# Patient Record
Sex: FEMALE | Race: WHITE | NOT HISPANIC OR LATINO
[De-identification: names, ages, dates, MRNs, and addresses within clinical notes are randomized per-mention and may not be internally consistent; named-entity substitution may affect disease eponyms.]

---

## 2017-02-01 ENCOUNTER — APPOINTMENT (OUTPATIENT)
Dept: OBGYN | Facility: HOSPITAL | Age: 42
End: 2017-02-01

## 2019-01-25 ENCOUNTER — EMERGENCY (EMERGENCY)
Facility: HOSPITAL | Age: 44
LOS: 1 days | Discharge: HOME | End: 2019-01-25
Attending: EMERGENCY MEDICINE | Admitting: EMERGENCY MEDICINE
Payer: MEDICAID

## 2019-01-25 VITALS
SYSTOLIC BLOOD PRESSURE: 106 MMHG | HEART RATE: 72 BPM | TEMPERATURE: 98 F | RESPIRATION RATE: 16 BRPM | OXYGEN SATURATION: 96 % | DIASTOLIC BLOOD PRESSURE: 69 MMHG

## 2019-01-25 VITALS
HEART RATE: 71 BPM | SYSTOLIC BLOOD PRESSURE: 127 MMHG | OXYGEN SATURATION: 99 % | DIASTOLIC BLOOD PRESSURE: 86 MMHG | TEMPERATURE: 97 F | RESPIRATION RATE: 17 BRPM

## 2019-01-25 PROCEDURE — 70450 CT HEAD/BRAIN W/O DYE: CPT | Mod: 26

## 2019-01-25 PROCEDURE — 99284 EMERGENCY DEPT VISIT MOD MDM: CPT

## 2019-01-25 RX ORDER — OXYCODONE AND ACETAMINOPHEN 5; 325 MG/1; MG/1
2 TABLET ORAL ONCE
Qty: 0 | Refills: 0 | Status: DISCONTINUED | OUTPATIENT
Start: 2019-01-25 | End: 2019-01-25

## 2019-01-25 RX ORDER — OXYCODONE HYDROCHLORIDE 5 MG/1
1 TABLET ORAL
Qty: 9 | Refills: 0
Start: 2019-01-25 | End: 2019-01-27

## 2019-01-25 RX ORDER — GABAPENTIN 400 MG/1
1 CAPSULE ORAL
Qty: 12 | Refills: 0
Start: 2019-01-25 | End: 2019-01-28

## 2019-01-25 RX ORDER — CYCLOBENZAPRINE HYDROCHLORIDE 10 MG/1
10 TABLET, FILM COATED ORAL ONCE
Qty: 0 | Refills: 0 | Status: COMPLETED | OUTPATIENT
Start: 2019-01-25 | End: 2019-01-25

## 2019-01-25 RX ORDER — CYCLOBENZAPRINE HYDROCHLORIDE 10 MG/1
1 TABLET, FILM COATED ORAL
Qty: 8 | Refills: 0
Start: 2019-01-25 | End: 2019-01-28

## 2019-01-25 RX ORDER — HYDROCODONE BITARTRATE 50 MG/1
1 CAPSULE, EXTENDED RELEASE ORAL
Qty: 8 | Refills: 0 | OUTPATIENT
Start: 2019-01-25 | End: 2019-01-28

## 2019-01-25 RX ORDER — GABAPENTIN 400 MG/1
800 CAPSULE ORAL ONCE
Qty: 0 | Refills: 0 | Status: COMPLETED | OUTPATIENT
Start: 2019-01-25 | End: 2019-01-25

## 2019-01-25 RX ADMIN — GABAPENTIN 800 MILLIGRAM(S): 400 CAPSULE ORAL at 11:23

## 2019-01-25 RX ADMIN — CYCLOBENZAPRINE HYDROCHLORIDE 10 MILLIGRAM(S): 10 TABLET, FILM COATED ORAL at 11:21

## 2019-01-25 RX ADMIN — OXYCODONE AND ACETAMINOPHEN 2 TABLET(S): 5; 325 TABLET ORAL at 11:25

## 2019-01-25 NOTE — ED PROVIDER NOTE - NSFOLLOWUPINSTRUCTIONS_ED_ALL_ED_FT
Seen in Seen in ED left facial tingling and numbness. ED CAT scan negative stroke. Seen in ED left facial tingling and numbness. ED CAT scan negative stroke and you were seen by [facility]. If symptoms persist you can follow with [facility] at [phone]. You can continue your home meds Flexeril 10 mg every 12 hours, Gabapentin 800 mg every 8 hours and Vicodine 10 mg every 12 hours. NEVER DRIVE OR OPERATE MACHINERY ON ANY OF THESE MEDICATIONS. You must keep your Monday appointment with your Pain Management for refills. Return to ER for new or worsening symptoms.

## 2019-01-25 NOTE — ED ADULT NURSE REASSESSMENT NOTE - NS ED NURSE REASSESS COMMENT FT1
pt alert,oriented. reports increasing pains to both sides neck .radiating to head,back.increased pain l side.  states last received  meds 1/15.eval by md,meds as ordered

## 2019-01-25 NOTE — ED PROVIDER NOTE - CRANIAL NERVE AND PUPILLARY EXAM
tongue is midline/extra-ocular movements intact/Subj left vi-v3 numbness, intact forehead wrinkling, eomi intact, neg tongue deviation, weak turn head to left, strong turn head to right, neg pronator drift, 5/5 str ue and le, sensation intact x 4 extremities

## 2019-01-25 NOTE — ED PROVIDER NOTE - PROGRESS NOTE DETAILS
Favio att: CT head neg ich. Dc with meds until Monday when MD returns. GERALDO Stahl asked me to prescribe oxycodone 10 mg q8 x 3 days for patient on behalf of her as her token was not working

## 2019-01-25 NOTE — CONSULT NOTE ADULT - ASSESSMENT
42 YO F with degenerative disc disease throughout spine since an accident in 2011, currently seeing pain management out of pain meds, presents for L facial/arm numbness and pain x1 week. Neurology consulted to rule out a central cause. Pt with no stroke risk factors, highly unlikely that this is an acute ischemic event, however will r/o with CT Head. Her symptoms are likely related to her uncontrolled pain at the time / referred pain from cervical spine. Recommend pain management and symptomatic relief of her chronic pain. CT Head to r/o ischemia, CT C Spine. If normal, can follow up outpatient with her neurologist with whom she follows.

## 2019-01-25 NOTE — CONSULT NOTE ADULT - NSHPATTENDINGPLANDISCUSS_GEN_ALL_CORE
neurology resident and I agree with above plan and outpatient neurology follow up with her pain management and private neurologist.

## 2019-01-25 NOTE — ED ADULT TRIAGE NOTE - CHIEF COMPLAINT QUOTE
pt c/o left eye pain with numbness and tingling to the face radiating to the ear and sharp pains from the neck to the shoulder.

## 2019-01-25 NOTE — ED PROVIDER NOTE - OBJECTIVE STATEMENT
10:50 Favio att: 43F h/o djd c/o left face numbness and tingling x 1 -2 weeks. 2011 Window fell 27 floors and landed on patient's head resulting in multi-level DJD. Sees Pain Management Akikoarina in [facility] [phone] and takes Hydrocone 10/325 mg TID, Gabapentin 800 mg TID, and Flexeril 10 mg BID. Pain Mgmt doc "is on vacation and did not refill my meds (1 wk ago)." Past 1 left face tingling and numbness, "head feels like a subwoofer, can't hear well, left eye vision not as good as right eye vision." Earlier today patient was seen by Ophthamologist, report indicates consult for 2 wk left face and eye twitching, intact visual exam, referred for outpt MRI. Patient then saw Urgent Care this morning, rec'd a Toradol shot, referred to ER. Patient presents to ER extremely concerned her left face feels tingly and hotter than right. CT ordered and Neuro consulted. PMH djd, scoliosis, PSH tubal ligation MED mvi and as above PH [facility] [address]

## 2019-01-25 NOTE — CONSULT NOTE ADULT - SUBJECTIVE AND OBJECTIVE BOX
NEUROLOGY CONSULT     44 YO F with reported extensive degenerative disc disease throughout spine 2/2 to accident that occurred in 2011 presents with worsening pain L facial numbness. Pt notes that 1 week ago she woke up with symptoms of L facial numbness and pain and vision changes in L eye as well as decreased hearing in L ear. She tried to manage her discomfort, but ended up going to see an ophthalmologist for her vision changes. She presents to ED with ophtho note - normal exam, normal fundi b/l without vision loss. She notes slight head pain rather than headache, neck pain, blurry vision on L, when asked about numbness in arm or leg she endorses that she does have some more so in her arm than leg. Feels that her body is "split right down the middle" w almost no feeling whatsoever on her L side. No sharp shooting pain into the face, some heat like sensation over L face. No tinnitus or whooshing sounds in ear. No dizziness. N  She perseverates that she is on pain medications for her chronic pain and that she has been out of her meds for the past 1 week since her pain management doctor is out of town until Mon. She takes pain meds and gets epidural injections as well by a neurologist - Dr Barreto. She notes the pain in her neck and back have worsened over this week as well and she has some difficulty with turning her head side to side.         PAST MEDICAL & SURGICAL HISTORY:  DDD      Allergies  naproxen (Anaphylaxis)  Intolerances        MEDICATIONS  (STANDING):    MEDICATIONS  (PRN):      SOCIAL HISTORY: Denies tobacco/EtOH/drug use     FAMILY HISTORY: noncotnributory      REVIEW OF SYSTEMS:  CONSTITUTIONAL:  No weight loss, fever, chills, weakness or fatigue.  HEENT:  Eyes:  No visual loss, blurred vision, double vision or yellow sclerae. Ears, Nose, Throat:  No hearing loss, sneezing, congestion, runny nose or sore throat.  SKIN:  No rash or itching.  CARDIOVASCULAR:  No chest pain, chest pressure or chest discomfort. No palpitations or edema.  RESPIRATORY:  No shortness of breath, cough or sputum.  GASTROINTESTINAL:  No anorexia, nausea, vomiting or diarrhea. No abdominal pain or blood.  GENITOURINARY:  denies incontinence/retention   NEUROLOGICAL: see hpi  MUSCULOSKELETAL:  No muscle, back pain, joint pain or stiffness.  HEMATOLOGIC:  No anemia, bleeding or bruising.  LYMPHATICS:  No enlarged nodes. No history of splenectomy.  PSYCHIATRIC:  No history of depression or anxiety.  ENDOCRINOLOGIC:  No reports of sweating, cold or heat intolerance. No polyuria or polydipsia.      Vital Signs Last 24 Hrs  T(C): 36.3 (25 Jan 2019 09:54), Max: 36.3 (25 Jan 2019 09:54)  T(F): 97.3 (25 Jan 2019 09:54), Max: 97.3 (25 Jan 2019 09:54)  HR: 72 (25 Jan 2019 11:18) (71 - 72)  BP: 124/90 (25 Jan 2019 11:18) (124/90 - 127/86)  BP(mean): --  RR: 16 (25 Jan 2019 11:18) (16 - 17)  SpO2: 99% (25 Jan 2019 11:18) (99% - 99%)    PHYSICAL EXAM:   General appearance: No acute distress                 Mental Status: AAOx3, fluent speech, follows simple commands, able to name  Cranial Nerves: EOMI, PERRL, VFF, V1-V3 intact, facial symmetric,  tongue midline  Motor: strength 5/5 throughout. No drift x4  Sensation: Intact to LT throughout  Coordination: FTN intact b/l  Reflexes: 1+ bilateral biceps, brachioradialis, patellar and ankle  Gait: normal and stable.      LABS:              IMAGING: NEUROLOGY CONSULT     44 YO F with reported extensive degenerative disc disease throughout spine 2/2 to accident that occurred in 2011 presents with worsening pain L facial numbness. Pt notes that 1 week ago she woke up with symptoms of L facial numbness and pain and vision changes in L eye as well as decreased hearing in L ear. She tried to manage her discomfort, but ended up going to see an ophthalmologist for her vision changes. She presents to ED with ophtho note - normal exam, normal fundi b/l without vision loss. She notes slight head pain rather than headache, neck pain, blurry vision on L, when asked about numbness in arm or leg she endorses that she does have some more so in her arm than leg. Feels that her body is "split right down the middle" w almost no feeling whatsoever on her L side. No sharp shooting pain into the face, some heat like sensation over L face. No tinnitus or whooshing sounds in ear. No dizziness. Denies any focal weakness. No dysarthria, diplopia or dysphagia.  She perseverates that she is on pain medications (flexeril, gabapentin, oxycodone) for her chronic pain and that she has been out of her meds for the past 1 week since her pain management doctor is out of town until Mon. She takes pain meds and gets epidural injections as well by a neurologist - Dr Barreto. She notes the pain in her neck and back have worsened over this week as well and she has some difficulty with turning her head side to side.         PAST MEDICAL & SURGICAL HISTORY:  DDD      Allergies  naproxen (Anaphylaxis)  Intolerances        MEDICATIONS  (STANDING):    MEDICATIONS  (PRN):      SOCIAL HISTORY: Denies tobacco/EtOH/drug use     FAMILY HISTORY: noncotnributory      REVIEW OF SYSTEMS:  CONSTITUTIONAL:  No weight loss, fever, chills, weakness or fatigue.  HEENT:  Eyes:  No visual loss, blurred vision, double vision or yellow sclerae. Ears, Nose, Throat:  No hearing loss, sneezing, congestion, runny nose or sore throat.  SKIN:  No rash or itching.  CARDIOVASCULAR:  No chest pain, chest pressure or chest discomfort. No palpitations or edema.  RESPIRATORY:  No shortness of breath, cough or sputum.  GASTROINTESTINAL:  No anorexia, nausea, vomiting or diarrhea. No abdominal pain or blood.  GENITOURINARY:  denies incontinence/retention   NEUROLOGICAL: see hpi  MUSCULOSKELETAL:  No muscle, back pain, joint pain or stiffness.  HEMATOLOGIC:  No anemia, bleeding or bruising.  LYMPHATICS:  No enlarged nodes. No history of splenectomy.  PSYCHIATRIC:  No history of depression or anxiety.  ENDOCRINOLOGIC:  No reports of sweating, cold or heat intolerance. No polyuria or polydipsia.      Vital Signs Last 24 Hrs  T(C): 36.3 (25 Jan 2019 09:54), Max: 36.3 (25 Jan 2019 09:54)  T(F): 97.3 (25 Jan 2019 09:54), Max: 97.3 (25 Jan 2019 09:54)  HR: 72 (25 Jan 2019 11:18) (71 - 72)  BP: 124/90 (25 Jan 2019 11:18) (124/90 - 127/86)  BP(mean): --  RR: 16 (25 Jan 2019 11:18) (16 - 17)  SpO2: 99% (25 Jan 2019 11:18) (99% - 99%)    PHYSICAL EXAM:   General appearance: No acute distress                 Mental Status: AAOx3, fluent speech, follows simple commands, able to name  Cranial Nerves: EOMI, PERRL, VFF, face symmetric,  tongue midline  Motor: strength 5/5 R side throughout. 4+/5 effort and pain limited in UE.  Sensation: profound sensory loss over L face and L arm - almost none. Splits immediately down the middle when checking sensation on face.  Coordination: FTN intact b/l, no dysmetria  Reflexes: 2+ bilateral biceps, brachioradialis, patellar and ankle  Gait: normal no ataxia     LABS:              IMAGING:

## 2019-04-14 ENCOUNTER — EMERGENCY (EMERGENCY)
Facility: HOSPITAL | Age: 44
LOS: 1 days | Discharge: HOME | End: 2019-04-14
Attending: EMERGENCY MEDICINE | Admitting: EMERGENCY MEDICINE
Payer: MEDICAID

## 2019-04-14 VITALS
HEART RATE: 78 BPM | DIASTOLIC BLOOD PRESSURE: 98 MMHG | OXYGEN SATURATION: 99 % | RESPIRATION RATE: 30 BRPM | TEMPERATURE: 99 F | SYSTOLIC BLOOD PRESSURE: 139 MMHG

## 2019-04-14 VITALS
RESPIRATION RATE: 23 BRPM | SYSTOLIC BLOOD PRESSURE: 132 MMHG | HEART RATE: 60 BPM | OXYGEN SATURATION: 100 % | DIASTOLIC BLOOD PRESSURE: 75 MMHG

## 2019-04-14 LAB
ALBUMIN SERPL ELPH-MCNC: 4.7 G/DL — SIGNIFICANT CHANGE UP (ref 3.3–5)
ALP SERPL-CCNC: 61 U/L — SIGNIFICANT CHANGE UP (ref 40–120)
ALT FLD-CCNC: 12 U/L — SIGNIFICANT CHANGE UP (ref 4–33)
ANION GAP SERPL CALC-SCNC: 15 MMO/L — HIGH (ref 7–14)
AST SERPL-CCNC: 22 U/L — SIGNIFICANT CHANGE UP (ref 4–32)
B PERT DNA SPEC QL NAA+PROBE: NOT DETECTED — SIGNIFICANT CHANGE UP
BASE EXCESS BLDV CALC-SCNC: 0.8 MMOL/L — SIGNIFICANT CHANGE UP
BILIRUB SERPL-MCNC: < 0.2 MG/DL — LOW (ref 0.2–1.2)
BLOOD GAS VENOUS - CREATININE: 0.83 MG/DL — SIGNIFICANT CHANGE UP (ref 0.5–1.3)
BUN SERPL-MCNC: 19 MG/DL — SIGNIFICANT CHANGE UP (ref 7–23)
C PNEUM DNA SPEC QL NAA+PROBE: NOT DETECTED — SIGNIFICANT CHANGE UP
CALCIUM SERPL-MCNC: 9.9 MG/DL — SIGNIFICANT CHANGE UP (ref 8.4–10.5)
CHLORIDE BLDV-SCNC: 104 MMOL/L — SIGNIFICANT CHANGE UP (ref 96–108)
CHLORIDE SERPL-SCNC: 100 MMOL/L — SIGNIFICANT CHANGE UP (ref 98–107)
CO2 SERPL-SCNC: 21 MMOL/L — LOW (ref 22–31)
CREAT SERPL-MCNC: 0.76 MG/DL — SIGNIFICANT CHANGE UP (ref 0.5–1.3)
FLUAV H1 2009 PAND RNA SPEC QL NAA+PROBE: NOT DETECTED — SIGNIFICANT CHANGE UP
FLUAV H1 RNA SPEC QL NAA+PROBE: NOT DETECTED — SIGNIFICANT CHANGE UP
FLUAV H3 RNA SPEC QL NAA+PROBE: NOT DETECTED — SIGNIFICANT CHANGE UP
FLUAV SUBTYP SPEC NAA+PROBE: NOT DETECTED — SIGNIFICANT CHANGE UP
FLUBV RNA SPEC QL NAA+PROBE: NOT DETECTED — SIGNIFICANT CHANGE UP
GAS PNL BLDV: 139 MMOL/L — SIGNIFICANT CHANGE UP (ref 136–146)
GLUCOSE BLDV-MCNC: 92 — SIGNIFICANT CHANGE UP (ref 70–99)
GLUCOSE SERPL-MCNC: 88 MG/DL — SIGNIFICANT CHANGE UP (ref 70–99)
HADV DNA SPEC QL NAA+PROBE: NOT DETECTED — SIGNIFICANT CHANGE UP
HCG SERPL-ACNC: < 5 MIU/ML — SIGNIFICANT CHANGE UP
HCO3 BLDV-SCNC: 24 MMOL/L — SIGNIFICANT CHANGE UP (ref 20–27)
HCOV PNL SPEC NAA+PROBE: SIGNIFICANT CHANGE UP
HCT VFR BLD CALC: 44.1 % — SIGNIFICANT CHANGE UP (ref 34.5–45)
HCT VFR BLDV CALC: 44.7 % — SIGNIFICANT CHANGE UP (ref 34.5–45)
HGB BLD-MCNC: 14.5 G/DL — SIGNIFICANT CHANGE UP (ref 11.5–15.5)
HGB BLDV-MCNC: 14.6 G/DL — SIGNIFICANT CHANGE UP (ref 11.5–15.5)
HMPV RNA SPEC QL NAA+PROBE: NOT DETECTED — SIGNIFICANT CHANGE UP
HPIV1 RNA SPEC QL NAA+PROBE: NOT DETECTED — SIGNIFICANT CHANGE UP
HPIV2 RNA SPEC QL NAA+PROBE: NOT DETECTED — SIGNIFICANT CHANGE UP
HPIV3 RNA SPEC QL NAA+PROBE: NOT DETECTED — SIGNIFICANT CHANGE UP
HPIV4 RNA SPEC QL NAA+PROBE: NOT DETECTED — SIGNIFICANT CHANGE UP
LACTATE BLDV-MCNC: 2 MMOL/L — SIGNIFICANT CHANGE UP (ref 0.5–2)
MCHC RBC-ENTMCNC: 29.7 PG — SIGNIFICANT CHANGE UP (ref 27–34)
MCHC RBC-ENTMCNC: 32.9 % — SIGNIFICANT CHANGE UP (ref 32–36)
MCV RBC AUTO: 90.4 FL — SIGNIFICANT CHANGE UP (ref 80–100)
NRBC # FLD: 0 K/UL — SIGNIFICANT CHANGE UP (ref 0–0)
PCO2 BLDV: 38 MMHG — LOW (ref 41–51)
PH BLDV: 7.43 PH — SIGNIFICANT CHANGE UP (ref 7.32–7.43)
PLATELET # BLD AUTO: 249 K/UL — SIGNIFICANT CHANGE UP (ref 150–400)
PMV BLD: 12.3 FL — SIGNIFICANT CHANGE UP (ref 7–13)
PO2 BLDV: 31 MMHG — LOW (ref 35–40)
POTASSIUM BLDV-SCNC: 3.9 MMOL/L — SIGNIFICANT CHANGE UP (ref 3.4–4.5)
POTASSIUM SERPL-MCNC: 4.8 MMOL/L — SIGNIFICANT CHANGE UP (ref 3.5–5.3)
POTASSIUM SERPL-SCNC: 4.8 MMOL/L — SIGNIFICANT CHANGE UP (ref 3.5–5.3)
PROT SERPL-MCNC: 8.2 G/DL — SIGNIFICANT CHANGE UP (ref 6–8.3)
RBC # BLD: 4.88 M/UL — SIGNIFICANT CHANGE UP (ref 3.8–5.2)
RBC # FLD: 13.2 % — SIGNIFICANT CHANGE UP (ref 10.3–14.5)
RSV RNA SPEC QL NAA+PROBE: NOT DETECTED — SIGNIFICANT CHANGE UP
RV+EV RNA SPEC QL NAA+PROBE: NOT DETECTED — SIGNIFICANT CHANGE UP
SAO2 % BLDV: 58 % — LOW (ref 60–85)
SODIUM SERPL-SCNC: 136 MMOL/L — SIGNIFICANT CHANGE UP (ref 135–145)
TROPONIN T, HIGH SENSITIVITY: < 6 NG/L — SIGNIFICANT CHANGE UP (ref ?–14)
WBC # BLD: 12.37 K/UL — HIGH (ref 3.8–10.5)
WBC # FLD AUTO: 12.37 K/UL — HIGH (ref 3.8–10.5)

## 2019-04-14 PROCEDURE — 71275 CT ANGIOGRAPHY CHEST: CPT | Mod: 26

## 2019-04-14 PROCEDURE — 93010 ELECTROCARDIOGRAM REPORT: CPT

## 2019-04-14 PROCEDURE — 99284 EMERGENCY DEPT VISIT MOD MDM: CPT | Mod: 25

## 2019-04-14 RX ORDER — OXYCODONE AND ACETAMINOPHEN 5; 325 MG/1; MG/1
1 TABLET ORAL ONCE
Qty: 0 | Refills: 0 | Status: DISCONTINUED | OUTPATIENT
Start: 2019-04-14 | End: 2019-04-14

## 2019-04-14 RX ORDER — SODIUM CHLORIDE 9 MG/ML
1000 INJECTION INTRAMUSCULAR; INTRAVENOUS; SUBCUTANEOUS ONCE
Qty: 0 | Refills: 0 | Status: COMPLETED | OUTPATIENT
Start: 2019-04-14 | End: 2019-04-14

## 2019-04-14 RX ORDER — ALBUTEROL 90 UG/1
2.5 AEROSOL, METERED ORAL ONCE
Qty: 0 | Refills: 0 | Status: COMPLETED | OUTPATIENT
Start: 2019-04-14 | End: 2019-04-14

## 2019-04-14 RX ADMIN — ALBUTEROL 2.5 MILLIGRAM(S): 90 AEROSOL, METERED ORAL at 19:59

## 2019-04-14 RX ADMIN — OXYCODONE AND ACETAMINOPHEN 1 TABLET(S): 5; 325 TABLET ORAL at 21:14

## 2019-04-14 RX ADMIN — SODIUM CHLORIDE 1000 MILLILITER(S): 9 INJECTION INTRAMUSCULAR; INTRAVENOUS; SUBCUTANEOUS at 19:59

## 2019-04-14 NOTE — ED ADULT NURSE NOTE - CHIEF COMPLAINT QUOTE
reports diff breathing x 3 days with productive cough. did not check temp. pt hyperventilating on arrival,states  intermittent.  lungs clear,no wheezing  reports ch pains with cough

## 2019-04-14 NOTE — ED PROVIDER NOTE - ATTENDING CONTRIBUTION TO CARE
44F no PMH p/w 3d of b/l cp, upper abd pain. Also non-productive cough. Also worsening SOB. Went to PCP who empirically prescribed amoxicillin. Symptoms worsening so came to ED. Tachypenic, other vitals wnl. Exam as above. EKG incomplete RBBB.  ddx: Though pt is tachypneic, Lungs are CTAB and other vitals are completely normal. Possible PE vs. URI vs. less likely ACS or myocarditis. Possible aspect of rAD. Possible hyperventilation/panic but dx of exclusion.  CBC, cmp, vbg comp, blood cx, trop, RVP. IVF, trial neb. CTA. Pt already on abx and afebrile, will hold additional abx for now.   Trial neb.  D/w DR. amaya, pt transferred to [facility] in ED for further care.

## 2019-04-14 NOTE — ED ADULT NURSE REASSESSMENT NOTE - NS ED NURSE REASSESS COMMENT FT1
report received from Sammy RN, pt A&Ox3, tachypnic with RR 26, pt denies n/v/d, dizziness, headache, cp. pt NSR on CM, sating 100 oxygen on room air, MD aware of RR, will continue to monitor.

## 2019-04-14 NOTE — ED PROVIDER NOTE - CLINICAL SUMMARY MEDICAL DECISION MAKING FREE TEXT BOX
44F w/ sob, cough, chest and abd pain, dramatically increased work of breathing, however besides RR other vital signs wnl and lung auscultation unremarkable; high degree of suspicion for PE, also concern for viral uri, will work up for ACS; will reassess

## 2019-04-14 NOTE — ED PROVIDER NOTE - NSFOLLOWUPINSTRUCTIONS_ED_ALL_ED_FT
1) Please follow-up with your primary care doctor.  If you cannot follow-up with your doctor(s), please return to the ED for any urgent issues.  2) If you have any worsening of symptoms or any other concerns please return to the ED immediately.  3) Please continue taking your home medications as directed.  4) You may have been given a copy of your labs and/or imaging.  Please go over these with your primary care doctor.

## 2019-04-14 NOTE — ED PROVIDER NOTE - PHYSICAL EXAMINATION
*GEN:   uncomfortable, in moderate respiratory distress and increased work of breathing, AOx3  *EYES:   pupils equally round and reactive to light, extra-occular movements intact  *HEENT:   airway patent, moist mucosal membranes, full ROM neck  *CV:   regular rate and rhythm  *RESP:   clear to auscultation bilaterally throughout, labored breathing - although vitals wnl  *ABD:   soft, mildly tender over epigastrum  *:   no cva/flank tenderness  *MSK:   no MSK tenderness or limited ROM  *SKIN:   dry, intact  *NEURO:   AOx3, no focal weakness or loss of sensation *GEN:   uncomfortable, in moderate respiratory distress and increased work of breathing, AOx3  *EYES:   pupils equally round and reactive to light, extra-occular movements intact  *HEENT:   airway patent, moist mucosal membranes, full ROM neck  *CV:   regular rate and rhythm  *RESP:   clear to auscultation bilaterally throughout, labored breathing - although vitals wnl  *ABD:   soft, mildly tender over epigastrum  *:   no cva/flank tenderness  *MSK:   no MSK tenderness or limited ROM  *SKIN:   dry, intact  *NEURO:   AOx3, no focal weakness or loss of sensation    Klepfish: no LE edema, normal equal distal pulses. 2-3 word sentences.  unofficial bedside sono: grossly normal heart function, no pericardial effusion, a-line predmoinance

## 2019-04-14 NOTE — ED PROVIDER NOTE - OBJECTIVE STATEMENT
44F w/out pmh p/w sob, chest pain, upper abd pain, cough x 3 days, worsening. Seen by PCP 3 days ago, prescribed amoxicillin for sinusitis, however sx worsening since then. Appears very tachypneic with increased work of breathing. No other med changes, doesn't take any meds normally. Denies leg pain / swelling. No neuro complaints. No recent travel or hospitalization. Pt with difficulty forming complete sentences 2/2 respiratory distress.    PCP: Edwina (?) Manpreet 44F w/out pmh p/w sob, chest pain, upper abd pain, cough x 3 days, worsening. Seen by PCP 3 days ago, prescribed amoxicillin for sinusitis, however sx worsening since then. Appears very tachypneic with increased work of breathing. No other med changes, doesn't take any meds normally. Denies leg pain / swelling. No neuro complaints. No recent travel or hospitalization. Pt with difficulty forming complete sentences 2/2 respiratory distress.  PCP: Edwina (?) Manpreet De La Rosa: 44F no PMH p/w 3d of b/l cp, upper abd pain. Also non-productive cough. Also worsening SOB. Went to PCP who empirically prescribed amoxicillin. Symptoms worsening so came to ED. Denies f/c, rhinorrhea, sore throat, HA, NVD, urinary complaints, focal weakness/numbness, LE pain/swelling, black/bloody stool. 44F w/out pmh p/w sob, chest pain, upper abd pain, cough x 3 days, worsening. Seen by PCP 3 days ago, prescribed amoxicillin for sinusitis, however sx worsening since then. Appears very tachypneic with increased work of breathing. No other med changes, doesn't take any meds normally. Denies leg pain / swelling. No neuro complaints. No recent travel or hospitalization. Pt with difficulty forming complete sentences 2/2 respiratory distress.  PCP: Edwina (?) Manpreet De La Rosa: 44F no PMH p/w 3d of b/l cp, upper abd pain. Also non-productive cough. Also worsening SOB. Went to PCP who empirically prescribed amoxicillin. Symptoms worsening so came to ED. Denies f/c, rhinorrhea, sore throat, HA, NVD, urinary complaints, focal weakness/numbness, LE pain/swelling, black/bloody stool, rashes.

## 2019-04-14 NOTE — ED PROVIDER NOTE - PROGRESS NOTE DETAILS
Richard Roberson PGY2: vital signs remain unremarkable besides RR30, patient being transferred to [facility] for tachypnea

## 2019-04-14 NOTE — ED ADULT NURSE NOTE - OBJECTIVE STATEMENT
Pt received to intake tachypneic and c/o difficulty breathing and cough x 3 days. Lungs are CTA and pt with O2 sat of 100% on room air. Labs sent and pt taken to main ED.

## 2019-04-14 NOTE — ED ADULT TRIAGE NOTE - CHIEF COMPLAINT QUOTE
reports diff breathing x 3 days with productive cough. did not check temp. pt hyperventilating on arrival,states  intermittent.  lungs clear,no wheezing reports diff breathing x 3 days with productive cough. did not check temp. pt hyperventilating on arrival,states  intermittent.  lungs clear,no wheezing  reports ch pains with cough

## 2019-04-15 LAB
SPECIMEN SOURCE: SIGNIFICANT CHANGE UP
SPECIMEN SOURCE: SIGNIFICANT CHANGE UP

## 2019-04-19 LAB
BACTERIA BLD CULT: SIGNIFICANT CHANGE UP
BACTERIA BLD CULT: SIGNIFICANT CHANGE UP

## 2019-05-17 ENCOUNTER — INPATIENT (INPATIENT)
Facility: HOSPITAL | Age: 44
LOS: 2 days | Discharge: HOME | End: 2019-05-20
Attending: HOSPITALIST | Admitting: HOSPITALIST
Payer: MEDICAID

## 2019-05-17 VITALS
RESPIRATION RATE: 17 BRPM | SYSTOLIC BLOOD PRESSURE: 130 MMHG | TEMPERATURE: 98 F | HEART RATE: 76 BPM | OXYGEN SATURATION: 99 % | DIASTOLIC BLOOD PRESSURE: 95 MMHG

## 2019-05-17 LAB
ALBUMIN SERPL ELPH-MCNC: 4.4 G/DL — SIGNIFICANT CHANGE UP (ref 3.3–5)
ALP SERPL-CCNC: 52 U/L — SIGNIFICANT CHANGE UP (ref 40–120)
ALT FLD-CCNC: 9 U/L — SIGNIFICANT CHANGE UP (ref 4–33)
ANION GAP SERPL CALC-SCNC: 13 MMO/L — SIGNIFICANT CHANGE UP (ref 7–14)
APTT BLD: 28.1 SEC — SIGNIFICANT CHANGE UP (ref 27.5–36.3)
AST SERPL-CCNC: 12 U/L — SIGNIFICANT CHANGE UP (ref 4–32)
BASOPHILS # BLD AUTO: 0.01 K/UL — SIGNIFICANT CHANGE UP (ref 0–0.2)
BASOPHILS NFR BLD AUTO: 0.1 % — SIGNIFICANT CHANGE UP (ref 0–2)
BILIRUB SERPL-MCNC: 0.2 MG/DL — SIGNIFICANT CHANGE UP (ref 0.2–1.2)
BLD GP AB SCN SERPL QL: NEGATIVE — SIGNIFICANT CHANGE UP
BUN SERPL-MCNC: 12 MG/DL — SIGNIFICANT CHANGE UP (ref 7–23)
CALCIUM SERPL-MCNC: 9.9 MG/DL — SIGNIFICANT CHANGE UP (ref 8.4–10.5)
CHLORIDE SERPL-SCNC: 104 MMOL/L — SIGNIFICANT CHANGE UP (ref 98–107)
CO2 SERPL-SCNC: 23 MMOL/L — SIGNIFICANT CHANGE UP (ref 22–31)
CREAT SERPL-MCNC: 0.67 MG/DL — SIGNIFICANT CHANGE UP (ref 0.5–1.3)
EOSINOPHIL # BLD AUTO: 0 K/UL — SIGNIFICANT CHANGE UP (ref 0–0.5)
EOSINOPHIL NFR BLD AUTO: 0 % — SIGNIFICANT CHANGE UP (ref 0–6)
GLUCOSE SERPL-MCNC: 95 MG/DL — SIGNIFICANT CHANGE UP (ref 70–99)
HCG SERPL-ACNC: < 5 MIU/ML — SIGNIFICANT CHANGE UP
HCT VFR BLD CALC: 43 % — SIGNIFICANT CHANGE UP (ref 34.5–45)
HGB BLD-MCNC: 13.9 G/DL — SIGNIFICANT CHANGE UP (ref 11.5–15.5)
IMM GRANULOCYTES NFR BLD AUTO: 0.4 % — SIGNIFICANT CHANGE UP (ref 0–1.5)
INR BLD: 1.13 — SIGNIFICANT CHANGE UP (ref 0.88–1.17)
LYMPHOCYTES # BLD AUTO: 1.32 K/UL — SIGNIFICANT CHANGE UP (ref 1–3.3)
LYMPHOCYTES # BLD AUTO: 12.8 % — LOW (ref 13–44)
MCHC RBC-ENTMCNC: 29.9 PG — SIGNIFICANT CHANGE UP (ref 27–34)
MCHC RBC-ENTMCNC: 32.3 % — SIGNIFICANT CHANGE UP (ref 32–36)
MCV RBC AUTO: 92.5 FL — SIGNIFICANT CHANGE UP (ref 80–100)
MONOCYTES # BLD AUTO: 0.25 K/UL — SIGNIFICANT CHANGE UP (ref 0–0.9)
MONOCYTES NFR BLD AUTO: 2.4 % — SIGNIFICANT CHANGE UP (ref 2–14)
NEUTROPHILS # BLD AUTO: 8.71 K/UL — HIGH (ref 1.8–7.4)
NEUTROPHILS NFR BLD AUTO: 84.3 % — HIGH (ref 43–77)
NRBC # FLD: 0 K/UL — SIGNIFICANT CHANGE UP (ref 0–0)
PLATELET # BLD AUTO: 234 K/UL — SIGNIFICANT CHANGE UP (ref 150–400)
PMV BLD: 11.1 FL — SIGNIFICANT CHANGE UP (ref 7–13)
POTASSIUM SERPL-MCNC: 4 MMOL/L — SIGNIFICANT CHANGE UP (ref 3.5–5.3)
POTASSIUM SERPL-SCNC: 4 MMOL/L — SIGNIFICANT CHANGE UP (ref 3.5–5.3)
PROT SERPL-MCNC: 7.8 G/DL — SIGNIFICANT CHANGE UP (ref 6–8.3)
PROTHROM AB SERPL-ACNC: 12.6 SEC — SIGNIFICANT CHANGE UP (ref 9.8–13.1)
RBC # BLD: 4.65 M/UL — SIGNIFICANT CHANGE UP (ref 3.8–5.2)
RBC # FLD: 12.8 % — SIGNIFICANT CHANGE UP (ref 10.3–14.5)
RH IG SCN BLD-IMP: POSITIVE — SIGNIFICANT CHANGE UP
SODIUM SERPL-SCNC: 140 MMOL/L — SIGNIFICANT CHANGE UP (ref 135–145)
WBC # BLD: 10.33 K/UL — SIGNIFICANT CHANGE UP (ref 3.8–10.5)
WBC # FLD AUTO: 10.33 K/UL — SIGNIFICANT CHANGE UP (ref 3.8–10.5)

## 2019-05-17 PROCEDURE — 70450 CT HEAD/BRAIN W/O DYE: CPT | Mod: 26

## 2019-05-17 RX ORDER — HYDROCORTISONE 20 MG
125 TABLET ORAL ONCE
Refills: 0 | Status: DISCONTINUED | OUTPATIENT
Start: 2019-05-17 | End: 2019-05-17

## 2019-05-17 RX ORDER — DIPHENHYDRAMINE HCL 50 MG
50 CAPSULE ORAL ONCE
Refills: 0 | Status: COMPLETED | OUTPATIENT
Start: 2019-05-17 | End: 2019-05-17

## 2019-05-17 RX ORDER — SUMATRIPTAN SUCCINATE 4 MG/.5ML
100 INJECTION, SOLUTION SUBCUTANEOUS ONCE
Refills: 0 | Status: COMPLETED | OUTPATIENT
Start: 2019-05-17 | End: 2019-05-17

## 2019-05-17 RX ORDER — DIPHENHYDRAMINE HCL 50 MG
50 CAPSULE ORAL EVERY 4 HOURS
Refills: 0 | Status: DISCONTINUED | OUTPATIENT
Start: 2019-05-17 | End: 2019-05-17

## 2019-05-17 RX ORDER — METOCLOPRAMIDE HCL 10 MG
10 TABLET ORAL ONCE
Refills: 0 | Status: COMPLETED | OUTPATIENT
Start: 2019-05-17 | End: 2019-05-17

## 2019-05-17 RX ORDER — SODIUM CHLORIDE 9 MG/ML
1000 INJECTION INTRAMUSCULAR; INTRAVENOUS; SUBCUTANEOUS ONCE
Refills: 0 | Status: COMPLETED | OUTPATIENT
Start: 2019-05-17 | End: 2019-05-17

## 2019-05-17 RX ORDER — VALPROIC ACID (AS SODIUM SALT) 250 MG/5ML
250 SOLUTION, ORAL ORAL ONCE
Refills: 0 | Status: COMPLETED | OUTPATIENT
Start: 2019-05-17 | End: 2019-05-17

## 2019-05-17 RX ADMIN — Medication 125 MILLIGRAM(S): at 18:37

## 2019-05-17 RX ADMIN — Medication 210 MILLIGRAM(S): at 20:34

## 2019-05-17 RX ADMIN — SUMATRIPTAN SUCCINATE 100 MILLIGRAM(S): 4 INJECTION, SOLUTION SUBCUTANEOUS at 20:34

## 2019-05-17 RX ADMIN — Medication 10 MILLIGRAM(S): at 16:55

## 2019-05-17 RX ADMIN — SODIUM CHLORIDE 1000 MILLILITER(S): 9 INJECTION INTRAMUSCULAR; INTRAVENOUS; SUBCUTANEOUS at 16:55

## 2019-05-17 RX ADMIN — Medication 50 MILLIGRAM(S): at 18:37

## 2019-05-17 NOTE — CONSULT NOTE ADULT - SUBJECTIVE AND OBJECTIVE BOX
HPI:  Pt is a 45 yo F with a PMH of Spinal Stenosis (s/p trauma), Chronic Pain Syndrome and Chronic HAs who is presenting with a positional HA since epidural 2 days PTA. Pt notes she was getting epidural for back pain, immediately afterwards pt sat up and felt a throbbing holocephalic 10/10 HA leading her to scream in pain uncontrollably. Pt then had a blood patch placed during the same procedure for resumed CSF leak HA. Since then pt has had the same HA which is still positional (sitting/standing up) even though she has been drinking more water and caffeine, as per her anesthesiologist. Pt also notes taking Tylenol, Fioricet and Zyrtec without any relief. Pt notes that this HA is significantly worse and different than her baseline HAs, which are daily. Pt does not have a neurologist as she was recently transferred to the care of a chronic pain specialist. Pt denies any fevers, chills, dizziness, new numbness/tingling/weakness, changes in vision or speech.      MEDICATIONS  (STANDING):    MEDICATIONS  (PRN):    PAST MEDICAL & SURGICAL HISTORY:  No pertinent past medical history  No significant past surgical history    FAMILY HISTORY:    Allergies    naproxen (Anaphylaxis)    Intolerances    SHx - No smoking, No ETOH, No drug abuse    Review of Systems:  See HPI.    Vital Signs Last 24 Hrs  T(C): 36.9 (17 May 2019 17:39), Max: 36.9 (17 May 2019 17:39)  T(F): 98.5 (17 May 2019 17:39), Max: 98.5 (17 May 2019 17:39)  HR: 75 (17 May 2019 17:39) (75 - 76)  BP: 100/55 (17 May 2019 17:39) (100/55 - 130/95)  BP(mean): --  RR: 18 (17 May 2019 17:39) (17 - 18)  SpO2: 100% (17 May 2019 17:39) (99% - 100%)      General Exam:   General appearance: No acute distress, though gets in more distress as exam proceeds     Neurological Exam:  Mental Status: Orientated to self, date and place.  Attention intact.  No dysarthria. Speech fluent.  Cranial Nerves:   PERRL, EOMI, VFF, no nystagmus.    CN V1-3 intact to light touch .  No facial asymmetry.  Hearing intact to finger rub bilaterally.  Tongue, uvula and palate midline.  Sternocleidomastoid and Trapezius intact bilaterally.    Motor:   Tone: normal.                  Strength:     [] Upper extremity                      Delt       Bicep    Tricep                                                  R         5/5        5/5        5/5       5/5                                               L          5/5        5/5        5/5       5/5  [] Lower extremity                       HF          KE          KF        DF         PF                                               R        5/5        5/5        5/5       5/5       5/5                                               L         5/5        5/5       5/5       5/5        5/5  Pronator drift: none                 Dysmetria: None to finger-nose-finger b/l  No truncal ataxia.    Tremor: No resting, postural or action tremor.  No myoclonus.    Sensation: intact to light touch throughout    Toes downgoing b/l      05-17    140  |  104  |  12  ----------------------------<  95  4.0   |  23  |  0.67    Ca    9.9      17 May 2019 16:37    TPro  7.8  /  Alb  4.4  /  TBili  0.2  /  DBili  x   /  AST  12  /  ALT  9   /  AlkPhos  52  05-17               13.9   10.33 )-----------( 234      ( 17 May 2019 16:45 )             43.0

## 2019-05-17 NOTE — ED ADULT NURSE NOTE - OBJECTIVE STATEMENT
pt is in bed A and Ox 3 in NAD, pt reports " I ws having and epidural but they messed up my anesthesia and I felt everything and It still hurts me" pt c/o back and neck pain. denies photosensitivity, denies fever ro chills, PERRLA extremities are strong and equal denies N/V. orders noted and completed.

## 2019-05-17 NOTE — ED PROVIDER NOTE - PHYSICAL EXAMINATION
epidural sites without leakage, no surrounding erythema  moving all extremities  Neck FROM epidural sites without leakage, no surrounding erythema  moving all extremities, sensation intact  Neck FROM epidural sites without leakage, no surrounding erythema, no tenderness  moving all extremities, sensation intact  Neck FROM

## 2019-05-17 NOTE — ED ADULT NURSE NOTE - NSIMPLEMENTINTERV_GEN_ALL_ED
Implemented All Universal Safety Interventions:  [place] to call system. Call bell, personal items and telephone within reach. Instruct patient to call for assistance. Room bathroom lighting operational. Non-slip footwear when patient is off stretcher. Physically safe environment: no spills, clutter or unnecessary equipment. Stretcher in lowest position, wheels locked, appropriate side rails in place.

## 2019-05-17 NOTE — CONSULT NOTE ADULT - ATTENDING COMMENTS
patient seen and examined agree with above.  Agree patient already had blood patch .  Now just hydrate and pain meds

## 2019-05-17 NOTE — ED PROVIDER NOTE - PROGRESS NOTE DETAILS
charbel acharya: awaiting Anesthesia call back charbel acharya: pt improved after reglan. awaiting call back from anesthesia. charbel acharya: pt slightly improved after reglan. awaiting call back from anesthesia. charbel acharya: spoke with Anesthesia- will come see patient now. pt stable. Rory Krueger: seen by Anesthesia who states patient needs to follow up for patch at appt on Tuesday nothing acutely to do here. States symptoms could be from irritation from air which eventually resolves on its own. Also seen by Neuro for persistent headaches, rec medication regimen no other intervention at this time. Meds ordered will reassess. GERALDO Krueger: Pt still admits to having a headache. has been intermittently resting and in no acute distress. Discussed with hospitalist will admit for further pain management/ consultation re-eval. GERALDO Krueger: Pt still admits to having a headache. has been intermittently resting and in no acute distress. ct showing scattered air reflecting recent procedure. Discussed with hospitalist will admit for further pain management/ consultation re-eval.

## 2019-05-17 NOTE — ED PROVIDER NOTE - CLINICAL SUMMARY MEDICAL DECISION MAKING FREE TEXT BOX
44 y.o female pmhx of spinal stenosis coming in with headache that started after she had spinal epidural for chronic back pain on Wednesday with Dr. Aniket Elmore. Pt states that after procedure developed severe headache, had blood patch performed was given toradol and went home. Here with persistent headache- Will give pain control, basic labs, hydrate and consult Anesthesia.

## 2019-05-17 NOTE — CONSULT NOTE ADULT - ASSESSMENT
Pt is a 43 yo F with a PMH of Spinal Stenosis (s/p trauma), Chronic Pain Syndrome and Chronic HAs who is presenting with a positional HA (on sitting up/standing) since epidural 2 days PTA (s/p blood patch). Exam is remarkable for worsening pain and HA with any movement, though nonfocal. Given proximity of procedure (temporally), exam, normal vitals and blood work not likely to be infectious. Hx and exam consistent with low pressure HA, though given pt has already received blood patch treatment will be purely symptomatic.    Recommendations:  - Valproic Acid 250mg IV once over 15 minutes  - Sumatriptan 100mg po once, can be repeated once more after an hour if HA persistes  - Toradol 30mg IV once  - Follow up with outpatient pain management doctor for further treatment Pt is a 43 yo F with a PMH of Spinal Stenosis (s/p trauma), Chronic Pain Syndrome and Chronic HAs who is presenting with a positional HA (on sitting up/standing) since epidural 2 days PTA (s/p blood patch). Exam is remarkable for worsening pain and HA with any movement, though nonfocal. Given proximity of procedure (temporally), exam, normal vitals and blood work not likely to be infectious. Hx and exam consistent with low pressure HA, though given pt has already received blood patch treatment will be purely symptomatic.    Recommendations:  - Valproic Acid 250mg IV once over 15 minutes  - Sumatriptan 100mg po once, can be repeated once more after an hour if HA persistes  - Follow up with outpatient pain management doctor for further treatment Pt is a 43 yo F with a PMH of Spinal Stenosis (s/p trauma), Chronic Pain Syndrome and Chronic HAs who is presenting with a positional HA (on sitting up/standing) since epidural 2 days PTA (s/p blood patch). Exam is remarkable for worsening pain and HA with any movement, though nonfocal. Given proximity of procedure (temporally), exam, normal vitals and blood work not likely to be infectious. Hx and exam consistent with low pressure HA, though given pt has already received blood patch treatment will be purely symptomatic.    Recommendations:  - Valproic Acid 250mg IV once over 15 minutes  - Sumatriptan 100mg po once, can be repeated once more after an hour if HA persistes  - Encourage continued hydration and caffeine intake  - Anesthesiology on board  - Follow up with outpatient pain management doctor for further treatment

## 2019-05-17 NOTE — ED PROVIDER NOTE - OBJECTIVE STATEMENT
44 y.o female pmhx of spinal stenosis coming in with headache that started after she had spinal epidural for chronic back pain on Wednesday. 44 y.o female pmhx of spinal stenosis coming in with headache that started after she had spinal epidural for chronic back pain on Wednesday with Dr. Aniket Elmore. Pt states that after procedure developed severe headache, was given toradol and went home. Pt states headache persisted, received call from her doctor who advised drink fluids and caffeine and should feel better. Pt states still had headache today which prompted her to come in. Has been ambulatory. Took exam at school today. Denies fevers, chills, chest pain, SOB, cough, n/v/d, abdominal pain, numbness, tingling, weakness, urinary symptoms. 44 y.o female pmhx of spinal stenosis coming in with headache that started after she had spinal epidural for chronic back pain on Wednesday with Dr. Aniket Elmore. Pt states that after procedure developed severe headache, had blood patch performed was given toradol and went home. Pt states headache persisted, received call from her doctor who advised drink fluids and caffeine and should feel better. Pt states still had headache today which prompted her to come in. Headache is improved lying flat and worse when sitting up. Has been ambulatory. Took exam at school today. Denies fevers, chills, chest pain, SOB, cough, n/v/d, abdominal pain, numbness, tingling, weakness, urinary symptoms. 44 y.o female pmhx of spinal stenosis coming in with headache that started after she had spinal epidural for chronic back pain on Wednesday with Dr. Aniket Elmore. Pt states that after procedure developed severe headache, had blood patch performed was given toradol and went home. Pt states headache persisted, received call from her doctor who advised drink fluids and caffeine and should feel better. Pt states still had headache today which prompted her to come in. Headache is improved lying flat and worse when sitting up. Has been ambulatory. Took exam at school today. Denies fevers, chills, chest pain, SOB, cough, n/v/d, abdominal pain, numbness, tingling, weakness, urinary symptoms, no bladder or urinary incontinence no saddle anesthesia.

## 2019-05-17 NOTE — ED PROVIDER NOTE - ATTENDING CONTRIBUTION TO CARE
SAPNA LOPEZ MD: Attending performed HPI, past medical history, Social hx, ROS, PE and MDM, and agree with the above except where documented.  SAPNA LOPEZ MD: Reviewed and agree with the HPI as documented below:  44 y.o female pmhx of spinal stenosis coming in with headache that started after she had spinal epidural for chronic back pain on Wednesday with Dr. Aniket Elmore. Pt states that after procedure developed severe headache, had blood patch performed was given toradol and went home. Pt states headache persisted, received call from her doctor who advised drink fluids and caffeine and should feel better. Pt states still had headache today which prompted her to come in. Headache is improved lying flat and worse when sitting up. Has been ambulatory. Took exam at school today. Denies fevers, chills, chest pain, SOB, cough, n/v/d, abdominal pain, numbness, tingling, weakness, urinary symptoms, no bladder or urinary incontinence no saddle anesthesia.    PHYSICAL EXAM:  Vital signs reviewed.  GENERAL: Patient is awake and alert and in no acute distress.  Non-toxic appearing.  A+Ox4  HEAD:  Airway patent.  No oropharyngeal edema.  No stridor.  Auricles are normal.    EYES: EOM grossly intact, conjunctiva non-injected and sclera clear  NECK: Supple, No vertebral point tenderness to palpation.  CHEST/LUNG: Lungs clear to auscultation bilaterally; no wheeze, no rhonchi,  no rales.    HEART: Regular rate and rhythm;   ABDOMEN: Soft, non-tender to palpation.  No rebound/no guarding.  Bowel sounds present x 4.   MSK/EXTREMITIES: No clubbing or cyanosis. Back is nontender, with no vertebral point tenderness to palpation, no CVAT.  Moving all 4 extremities.     NEURO: Neurologically grossly intact.   No obvious deficits.   PSYCH: Psychiatrically normal mood and affect.  No apparent risk to self or others.       DR. GARDINER, ATTENDING MD:    I performed a face to face bedside interview with patient regarding history of present illness, review of symptoms and past medical history. I completed an independent physical exam.  I have discussed patient's plan of care with the team of health care providers.   I agree with note as stated above, having amended the EMR as needed to reflect my findings. I have discussed the assessment and plan of care.  This includes during the time I functioned as the attending physician for this patient.

## 2019-05-18 DIAGNOSIS — R51 HEADACHE: ICD-10-CM

## 2019-05-18 DIAGNOSIS — Z29.9 ENCOUNTER FOR PROPHYLACTIC MEASURES, UNSPECIFIED: ICD-10-CM

## 2019-05-18 DIAGNOSIS — G89.4 CHRONIC PAIN SYNDROME: ICD-10-CM

## 2019-05-18 PROCEDURE — 99223 1ST HOSP IP/OBS HIGH 75: CPT

## 2019-05-18 RX ORDER — TRAMADOL HYDROCHLORIDE 50 MG/1
25 TABLET ORAL EVERY 4 HOURS
Refills: 0 | Status: DISCONTINUED | OUTPATIENT
Start: 2019-05-18 | End: 2019-05-19

## 2019-05-18 RX ORDER — SUMATRIPTAN SUCCINATE 4 MG/.5ML
100 INJECTION, SOLUTION SUBCUTANEOUS ONCE
Refills: 0 | Status: COMPLETED | OUTPATIENT
Start: 2019-05-18 | End: 2019-05-18

## 2019-05-18 RX ORDER — OXYCODONE HYDROCHLORIDE 5 MG/1
5 TABLET ORAL ONCE
Refills: 0 | Status: DISCONTINUED | OUTPATIENT
Start: 2019-05-18 | End: 2019-05-18

## 2019-05-18 RX ORDER — CYCLOBENZAPRINE HYDROCHLORIDE 10 MG/1
10 TABLET, FILM COATED ORAL THREE TIMES A DAY
Refills: 0 | Status: DISCONTINUED | OUTPATIENT
Start: 2019-05-18 | End: 2019-05-20

## 2019-05-18 RX ORDER — GABAPENTIN 400 MG/1
800 CAPSULE ORAL THREE TIMES A DAY
Refills: 0 | Status: DISCONTINUED | OUTPATIENT
Start: 2019-05-18 | End: 2019-05-20

## 2019-05-18 RX ADMIN — CYCLOBENZAPRINE HYDROCHLORIDE 10 MILLIGRAM(S): 10 TABLET, FILM COATED ORAL at 13:54

## 2019-05-18 RX ADMIN — Medication 30 MILLILITER(S): at 23:25

## 2019-05-18 RX ADMIN — GABAPENTIN 800 MILLIGRAM(S): 400 CAPSULE ORAL at 23:26

## 2019-05-18 RX ADMIN — SUMATRIPTAN SUCCINATE 100 MILLIGRAM(S): 4 INJECTION, SOLUTION SUBCUTANEOUS at 04:10

## 2019-05-18 RX ADMIN — OXYCODONE HYDROCHLORIDE 5 MILLIGRAM(S): 5 TABLET ORAL at 22:58

## 2019-05-18 RX ADMIN — CYCLOBENZAPRINE HYDROCHLORIDE 10 MILLIGRAM(S): 10 TABLET, FILM COATED ORAL at 22:58

## 2019-05-18 RX ADMIN — SUMATRIPTAN SUCCINATE 100 MILLIGRAM(S): 4 INJECTION, SOLUTION SUBCUTANEOUS at 03:10

## 2019-05-18 RX ADMIN — GABAPENTIN 800 MILLIGRAM(S): 400 CAPSULE ORAL at 13:54

## 2019-05-18 NOTE — H&P ADULT - PROBLEM SELECTOR PLAN 2
- continue treatment as above  - Patient to have close F/U with outpatient pain management doctor upon

## 2019-05-18 NOTE — H&P ADULT - HISTORY OF PRESENT ILLNESS
HPI:  43 yo F with a PMH of spinal stenosis (s/p trauma), chronic pain syndrome and chronic HAs who is presenting with a positional HA since epidural 2 days PTA. Pt notes she was getting epidural for back pain, immediately afterwards pt sat up and felt a throbbing diffuse 10/10 HA leading her to scream in pain uncontrollably. Pt then had a blood patch placed during the same procedure for resumed CSF leak HA. Since then pt has had the same HA which is still positional (sitting/standing up) despite drinking more water and caffeine, as per her anesthesiologist. Pt also notes taking Tylenol, Fioricet and Zyrtec without any relief. Pt notes that this HA is significantly worse and different than her baseline HAs, which are daily. Pt denies any fevers, chills, dizziness, new numbness/tingling/weakness, changes in vision or speech.    PAST MEDICAL & SURGICAL HISTORY:  No pertinent past medical history  No significant past surgical history      Review of Systems:   CONSTITUTIONAL: No fever, weight loss, or fatigue  EYES: No eye pain, visual disturbances, or discharge  ENMT:  No difficulty hearing, tinnitus, vertigo; No sinus or throat pain  NECK: No pain or stiffness  BREASTS: No pain, masses, or nipple discharge  RESPIRATORY: No cough, wheezing, chills or hemoptysis; No shortness of breath  CARDIOVASCULAR: No chest pain, palpitations, dizziness, or leg swelling  GASTROINTESTINAL: No abdominal or epigastric pain. No nausea, vomiting, or hematemesis; No diarrhea or constipation. No melena or hematochezia.  GENITOURINARY: No dysuria, frequency, hematuria, or incontinence  NEUROLOGICAL: No headaches, memory loss, loss of strength, numbness, or tremors  SKIN: No itching, burning, rashes, or lesions   LYMPH NODES: No enlarged glands  ENDOCRINE: No heat or cold intolerance; No hair loss  MUSCULOSKELETAL: No joint pain or swelling; No muscle, back, or extremity pain  PSYCHIATRIC: No depression, anxiety, mood swings, or difficulty sleeping  HEME/LYMPH: No easy bruising, or bleeding gums  ALLERGY AND IMMUNOLOGIC: No hives or eczema    Allergies    naproxen (Anaphylaxis)    Intolerances        Social History:     FAMILY HISTORY:      MEDICATIONS  (STANDING):    MEDICATIONS  (PRN):      T(C): 37.1 (05-18-19 @ 06:04), Max: 37.1 (05-18-19 @ 06:04)  HR: 89 (05-18-19 @ 06:04) (68 - 89)  BP: 112/73 (05-18-19 @ 06:04) (100/55 - 131/79)  RR: 16 (05-18-19 @ 06:04) (16 - 18)  SpO2: 98% (05-18-19 @ 06:04) (95% - 100%)  Height (cm): 170.18 (05-18-19 @ 02:00)  Weight (kg): 83.10642366 (05-18-19 @ 02:00)  BMI (kg/m2): 29 (05-18-19 @ 02:00)  BSA (m2): 1.96 (05-18-19 @ 02:00)  CAPILLARY BLOOD GLUCOSE        I&O's Summary    17 May 2019 07:01  -  18 May 2019 07:00  --------------------------------------------------------  IN: 600 mL / OUT: 400 mL / NET: 200 mL        PHYSICAL EXAM:  GENERAL: NAD, well-developed  HEAD:  Atraumatic, Normocephalic  EYES: EOMI, PERRLA, conjunctiva and sclera clear  NECK: Supple, No elevated JVD  CHEST/LUNG: Clear to auscultation bilaterally; No wheeze  HEART: Regular rate and rhythm; No murmurs, rubs, or gallops  ABDOMEN: Soft, Nontender, Nondistended; Bowel sounds present  EXTREMITIES:  2+ Peripheral Pulses, No clubbing, cyanosis, or edema  PSYCH: AAOx3  NEUROLOGY: CN II-XII grossly intact, moving all extremities  SKIN: No rashes or lesions    LABS:                        13.9   10.33 )-----------( 234      ( 17 May 2019 16:45 )             43.0     05-17    140  |  104  |  12  ----------------------------<  95  4.0   |  23  |  0.67    Ca    9.9      17 May 2019 16:37    TPro  7.8  /  Alb  4.4  /  TBili  0.2  /  DBili  x   /  AST  12  /  ALT  9   /  AlkPhos  52  05-17    PT/INR - ( 17 May 2019 16:45 )   PT: 12.6 SEC;   INR: 1.13          PTT - ( 17 May 2019 16:45 )  PTT:28.1 SEC            RADIOLOGY & ADDITIONAL TESTS:    ECG Personally Reviewed -     Imaging Personally Reviewed:    Consultant(s) Notes Reviewed:      Care Discussed with Consultants/Other Providers: HPI:  43 yo F with a PMH of spinal stenosis (s/p trauma), chronic pain syndrome and chronic HAs who is presenting with a positional HA since epidural 2 days PTA. Pt notes she was getting epidural for back pain, immediately afterwards pt sat up and felt a throbbing diffuse 10/10 HA leading her to scream in pain uncontrollably. Pt then had a blood patch placed during the same procedure for resumed CSF leak HA. Since then pt has had the same HA which is still positional (sitting/standing up) despite drinking more water and caffeine, as per her anesthesiologist. Pt also notes taking Tylenol, Fioricet and Zyrtec without any relief. Pt notes that this HA is significantly worse and different than her baseline HAs, which are daily. States the pain radiates from her shoulders/neck to her whole head. She denies any fevers, chills, dizziness, new numbness/tingling/weakness, changes in vision or speech, N/V.     In the ED, she was found to be hemodynamically stable, labs WNL, CTH negative. She was given 50 IV benadryl, 125 IV solumedrol, 10 IV reglan, 100 mg sumatriptan x2, 250 IV valproic acid, 1L NS bolus, admitted to medicine for further management.     PAST MEDICAL & SURGICAL HISTORY:  No pertinent past medical history  No significant past surgical history      Review of Systems:   CONSTITUTIONAL: No fever, weight loss, or fatigue  EYES: No eye pain, visual disturbances, or discharge  ENMT:  No difficulty hearing, tinnitus, vertigo; No sinus or throat pain  NECK: + pain or stiffness  BREASTS: No pain, masses, or nipple discharge  RESPIRATORY: No cough, wheezing, chills or hemoptysis; No shortness of breath  CARDIOVASCULAR: No chest pain, palpitations, dizziness, or leg swelling  GASTROINTESTINAL: No abdominal or epigastric pain. No nausea, vomiting, or hematemesis; No diarrhea or constipation. No melena or hematochezia.  GENITOURINARY: No dysuria, frequency, hematuria, or incontinence  NEUROLOGICAL: No headaches, memory loss, loss of strength, numbness, or tremors  SKIN: No itching, burning, rashes, or lesions   LYMPH NODES: No enlarged glands  ENDOCRINE: No heat or cold intolerance; No hair loss  MUSCULOSKELETAL: No joint pain or swelling; + muscle/ back pain  PSYCHIATRIC: No depression, anxiety, mood swings, or difficulty sleeping  HEME/LYMPH: No easy bruising, or bleeding gums  ALLERGY AND IMMUNOLOGIC: No hives or eczema    Allergies    naproxen (Anaphylaxis)    Intolerances        Social History:   She denies smoking/alcohol, illicit substance use.    FAMILY HISTORY:      MEDICATIONS  (STANDING):    MEDICATIONS  (PRN):      T(C): 37.1 (05-18-19 @ 06:04), Max: 37.1 (05-18-19 @ 06:04)  HR: 89 (05-18-19 @ 06:04) (68 - 89)  BP: 112/73 (05-18-19 @ 06:04) (100/55 - 131/79)  RR: 16 (05-18-19 @ 06:04) (16 - 18)  SpO2: 98% (05-18-19 @ 06:04) (95% - 100%)  Height (cm): 170.18 (05-18-19 @ 02:00)  Weight (kg): 83.28781452 (05-18-19 @ 02:00)  BMI (kg/m2): 29 (05-18-19 @ 02:00)  BSA (m2): 1.96 (05-18-19 @ 02:00)  CAPILLARY BLOOD GLUCOSE        I&O's Summary    17 May 2019 07:01  -  18 May 2019 07:00  --------------------------------------------------------  IN: 600 mL / OUT: 400 mL / NET: 200 mL        PHYSICAL EXAM:  GENERAL: Moderate distress, cooperative with exam  HEAD:  Atraumatic, Normocephalic  EYES: EOMI, PERRLA, conjunctiva and sclera clear  NECK: Supple, No elevated JVD, tender to palpation posterior neck with muscle spasm  CHEST/LUNG: Clear to auscultation bilaterally; No wheeze  HEART: Regular rate and rhythm; No murmurs, rubs, or gallops  ABDOMEN: Soft, Nontender, Nondistended; Bowel sounds present  EXTREMITIES:  2+ Peripheral Pulses, No clubbing, cyanosis, or edema  PSYCH: AAOx3  NEUROLOGY: CN II-XII grossly intact, some diminished sensation Rt V1, moving all extremities  SKIN: No rashes or lesions    LABS:                        13.9   10.33 )-----------( 234      ( 17 May 2019 16:45 )             43.0     05-17    140  |  104  |  12  ----------------------------<  95  4.0   |  23  |  0.67    Ca    9.9      17 May 2019 16:37    TPro  7.8  /  Alb  4.4  /  TBili  0.2  /  DBili  x   /  AST  12  /  ALT  9   /  AlkPhos  52  05-17    PT/INR - ( 17 May 2019 16:45 )   PT: 12.6 SEC;   INR: 1.13          PTT - ( 17 May 2019 16:45 )  PTT:28.1 SEC            RADIOLOGY & ADDITIONAL TESTS:    ECG Personally Reviewed -     Imaging Personally Reviewed: Reviewed CT with radiologist, WNL    Consultant(s) Notes Reviewed:      Care Discussed with Consultants/Other Providers:

## 2019-05-18 NOTE — H&P ADULT - ASSESSMENT
45 yo F with a PMH of spinal stenosis (s/p trauma), chronic pain syndrome and chronic HAs who is presenting with a positional HA since epidural 2 days PTA admitted for further management.

## 2019-05-18 NOTE — H&P ADULT - PROBLEM SELECTOR PLAN 1
s/p blood patch for potential post-dural puncture headache, CTH negative, low suspicion for infectious etiology, no new neurologic sequela, trapezius spasm concerning for tension HA  - continue cyclobenzaprine 10 mg TID  - tramadol 25 mg q4 prn severe pain  - gabapentin 800 mg TID  - Neuro and Anesthesia recs appreciated s/p blood patch for potential post-dural puncture headache, CTH negative, low suspicion for infectious etiology, no new neurologic sequela, trapezius spasm concerning for concomitant tension HA  - s/p sumatriptan as per neuro  - continue cyclobenzaprine 10 mg TID  - tramadol 25 mg q4 prn severe pain  - gabapentin 800 mg TID  - continue adequate hydration and caffeine intake  - f/u with pain interventionalist on Tues, if repeat epidural blood patch is needed, floroscopy guidence in the clinic recommended  - Neuro and Anesthesia recs appreciated

## 2019-05-19 PROCEDURE — 99233 SBSQ HOSP IP/OBS HIGH 50: CPT

## 2019-05-19 RX ORDER — OXYCODONE HYDROCHLORIDE 5 MG/1
10 TABLET ORAL EVERY 6 HOURS
Refills: 0 | Status: DISCONTINUED | OUTPATIENT
Start: 2019-05-19 | End: 2019-05-20

## 2019-05-19 RX ADMIN — CYCLOBENZAPRINE HYDROCHLORIDE 10 MILLIGRAM(S): 10 TABLET, FILM COATED ORAL at 14:38

## 2019-05-19 RX ADMIN — OXYCODONE HYDROCHLORIDE 10 MILLIGRAM(S): 5 TABLET ORAL at 09:12

## 2019-05-19 RX ADMIN — OXYCODONE HYDROCHLORIDE 5 MILLIGRAM(S): 5 TABLET ORAL at 00:00

## 2019-05-19 RX ADMIN — GABAPENTIN 800 MILLIGRAM(S): 400 CAPSULE ORAL at 14:37

## 2019-05-19 RX ADMIN — CYCLOBENZAPRINE HYDROCHLORIDE 10 MILLIGRAM(S): 10 TABLET, FILM COATED ORAL at 06:36

## 2019-05-19 RX ADMIN — OXYCODONE HYDROCHLORIDE 10 MILLIGRAM(S): 5 TABLET ORAL at 22:40

## 2019-05-19 RX ADMIN — OXYCODONE HYDROCHLORIDE 10 MILLIGRAM(S): 5 TABLET ORAL at 15:27

## 2019-05-19 RX ADMIN — OXYCODONE HYDROCHLORIDE 10 MILLIGRAM(S): 5 TABLET ORAL at 09:45

## 2019-05-19 RX ADMIN — CYCLOBENZAPRINE HYDROCHLORIDE 10 MILLIGRAM(S): 10 TABLET, FILM COATED ORAL at 22:40

## 2019-05-19 RX ADMIN — GABAPENTIN 800 MILLIGRAM(S): 400 CAPSULE ORAL at 06:37

## 2019-05-19 RX ADMIN — OXYCODONE HYDROCHLORIDE 10 MILLIGRAM(S): 5 TABLET ORAL at 23:45

## 2019-05-19 RX ADMIN — OXYCODONE HYDROCHLORIDE 10 MILLIGRAM(S): 5 TABLET ORAL at 16:00

## 2019-05-19 RX ADMIN — GABAPENTIN 800 MILLIGRAM(S): 400 CAPSULE ORAL at 22:40

## 2019-05-20 ENCOUNTER — TRANSCRIPTION ENCOUNTER (OUTPATIENT)
Age: 44
End: 2019-05-20

## 2019-05-20 VITALS
SYSTOLIC BLOOD PRESSURE: 122 MMHG | TEMPERATURE: 99 F | RESPIRATION RATE: 16 BRPM | HEART RATE: 96 BPM | OXYGEN SATURATION: 97 % | DIASTOLIC BLOOD PRESSURE: 77 MMHG

## 2019-05-20 PROCEDURE — 99239 HOSP IP/OBS DSCHRG MGMT >30: CPT

## 2019-05-20 RX ORDER — OXYCODONE HYDROCHLORIDE 5 MG/1
1 TABLET ORAL
Qty: 6 | Refills: 0
Start: 2019-05-20 | End: 2019-05-21

## 2019-05-20 RX ORDER — OXYCODONE HYDROCHLORIDE 5 MG/1
1 TABLET ORAL
Qty: 21 | Refills: 0
Start: 2019-05-20 | End: 2019-05-26

## 2019-05-20 RX ADMIN — CYCLOBENZAPRINE HYDROCHLORIDE 10 MILLIGRAM(S): 10 TABLET, FILM COATED ORAL at 06:40

## 2019-05-20 RX ADMIN — OXYCODONE HYDROCHLORIDE 10 MILLIGRAM(S): 5 TABLET ORAL at 13:47

## 2019-05-20 RX ADMIN — CYCLOBENZAPRINE HYDROCHLORIDE 10 MILLIGRAM(S): 10 TABLET, FILM COATED ORAL at 13:47

## 2019-05-20 RX ADMIN — GABAPENTIN 800 MILLIGRAM(S): 400 CAPSULE ORAL at 06:40

## 2019-05-20 RX ADMIN — OXYCODONE HYDROCHLORIDE 10 MILLIGRAM(S): 5 TABLET ORAL at 06:40

## 2019-05-20 NOTE — DISCHARGE NOTE PROVIDER - CARE PROVIDER_API CALL
Nam Case  Your primary care physician.  Phone: [phone]  Fax: (   )    -  Follow Up Time: Nam Case  Your primary care physician.  Phone: [phone]  Fax: (   )    -  Follow Up Time:     Haresh Archuleta  Phone: [phone]  Fax: (   )    -  Follow Up Time:

## 2019-05-20 NOTE — PROGRESS NOTE ADULT - ASSESSMENT
45 yo F with a PMH of spinal stenosis (s/p trauma), chronic pain syndrome and chronic HAs who is presenting with a positional HA since epidural 2 days PTA admitted for further management.
45 yo F with a PMH of spinal stenosis (s/p trauma), chronic pain syndrome and chronic HAs who is presenting with a positional HA since epidural 2 days PTA admitted for further management.
45 yo F with a PMH of spinal stenosis (s/p trauma), chronic pain syndrome and chronic HAs who is presenting with a positional HA since epidural 2 days PTA. Pt notes she was getting epidural for back pain, immediately afterwards pt sat up and felt a throbbing diffuse 10/10 HA leading her to scream in pain uncontrollably. Pt then had a blood patch placed during the same procedure for resumed CSF leak HA. Since then pt has had the same HA     continue pain meds    hydrate

## 2019-05-20 NOTE — PROGRESS NOTE ADULT - PROBLEM SELECTOR PROBLEM 1
Acute intractable headache, unspecified headache type
Acute intractable headache, unspecified headache type

## 2019-05-20 NOTE — DISCHARGE NOTE PROVIDER - NSDCCPCAREPLAN_GEN_ALL_CORE_FT
PRINCIPAL DISCHARGE DIAGNOSIS  Diagnosis: Headache  Assessment and Plan of Treatment: Headaches caused by Epidural injection. Pain control as needed with baseline home Oxycodone IR 10mg by mouth every 8 hours PRN for SEVERE pain ONLY. Please follow up with your primary care physician after discharge for continued monitoring and management. PRINCIPAL DISCHARGE DIAGNOSIS  Diagnosis: Headache  Assessment and Plan of Treatment: Headaches caused by Epidural injection. Pain control as needed with baseline home Oxycodone IR 10mg by mouth every 8 hours PRN for SEVERE pain ONLY. Please follow up with your primary care physician and pain management after discharge for continued monitoring and management. PRINCIPAL DISCHARGE DIAGNOSIS  Diagnosis: Headache  Assessment and Plan of Treatment: Headaches caused by Epidural injection. Pain control as needed with baseline home Oxycodone IR 10mg by mouth every 8 hours PRN for SEVERE pain ONLY. Please follow up with your primary care physician in 1 week from discharge and pain management, Willis Ortega MD on scheduled appointment 5/28/2019 after discharge for continued monitoring and management. PRINCIPAL DISCHARGE DIAGNOSIS  Diagnosis: Headache  Assessment and Plan of Treatment: Headaches caused by Epidural injection. Pain control as needed with baseline home Oxycodone IR 10mg by mouth every 8 hours PRN for SEVERE pain ONLY. Please follow up with your primary care physician in 1 week from discharge and pain management on scheduled appointment 5/28/2019 after discharge for continued monitoring and management. PRINCIPAL DISCHARGE DIAGNOSIS  Diagnosis: Headache  Assessment and Plan of Treatment: Headaches caused by Epidural injection. Pain control as needed with baseline home Oxycodone IR 10mg by mouth every 8 hours PRN for SEVERE pain ONLY. Please follow up with your primary care physician in 1 week from discharge and Dr. Haresh Archuleta, Pain Management on scheduled appointment 5/21/2019 after discharge for continued monitoring and management. PRINCIPAL DISCHARGE DIAGNOSIS  Diagnosis: Headache  Assessment and Plan of Treatment: Headaches caused by Epidural injection. Pain control as needed with baseline home Oxycodone IR 10mg by mouth every 8 hours PRN for SEVERE pain ONLY (2 day prescription given until follow up appointment). Please follow up with your primary care physician in 1 week from discharge and Dr. Haresh Archuleta, Pain Management on scheduled appointment 5/21/2019 after discharge for continued monitoring and management.

## 2019-05-20 NOTE — PROGRESS NOTE ADULT - PROBLEM SELECTOR PLAN 3
DVT PPx: ICD  Diet: Regular with caffeine  Dispo: Home when pain controlled
DVT PPx: ICD  Diet: Regular with caffeine  Dispo: stable for DC home. DC time: 32 min

## 2019-05-20 NOTE — DISCHARGE NOTE PROVIDER - HOSPITAL COURSE
Mr. Urbina is a 45YO F with PMHX of Spinal Stenosis s/p Trauma, Chronic Pain Syndrome and Chronic HA who presented complaining of positional HA since epidural 2 days prior to admission. Patient notes getting epidural for back pain, immediately sat up and felt a throbing diffuse 10/10 HA leading her to scream in pain uncontrollably. Since then, pain as been the same and positional, unresolved by Water, Caffeine, Tylenol, Fiorcet or Zyrtec.         In the ED, she was found to be hemodynamically stable, labs WNL, CTH negative. She was given 50 IV Benadryl, 125 IV Solumedrol, 10 IV Reglan, 100 mg Sumatriptan x2, 250 IV Valproic Acid and 1L NS bolus. No neurological deficits noted. Patient admitted to medicine for further management.         Upon admission, patient's Oxycodone home dose resume and pain improved significantly. Patient remained with no focal neurological deficits throughout admission. On 5/20, the patient, case discussed with Dr. Flores and patient medically stable and clear for discharge home. Mr. Urbina is a 45YO F with PMHX of Spinal Stenosis s/p Trauma, Chronic Pain Syndrome and Chronic HA who presented complaining of positional HA since epidural 2 days prior to admission. Patient notes getting epidural for back pain, immediately sat up and felt a throbing diffuse 10/10 HA leading her to scream in pain uncontrollably. Since then, pain as been the same and positional, unresolved by Water, Caffeine, Tylenol, Fiorcet or Zyrtec.         In the ED, she was found to be hemodynamically stable, labs WNL, CTH negative. She was given 50 IV Benadryl, 125 IV Solumedrol, 10 IV Reglan, 100 mg Sumatriptan x2, 250 IV Valproic Acid and 1L NS bolus. No neurological deficits noted. Patient admitted to medicine for further management.         Upon admission, patient's Oxycodone home dose resume and pain improved significantly. Patient remained with no focal neurological deficits throughout admission. On 5/20, the patient, case discussed with Dr. Flores and patient medically stable and clear for discharge home.         [id number]     Willis Ortega MD Mr. Urbina is a 45YO F with PMHX of Spinal Stenosis s/p Trauma, Chronic Pain Syndrome and Chronic HA who presented complaining of positional HA since epidural 2 days prior to admission. Patient notes getting epidural for back pain, immediately sat up and felt a throbing diffuse 10/10 HA leading her to scream in pain uncontrollably. Since then, pain as been the same and positional, unresolved by Water, Caffeine, Tylenol, Fiorcet or Zyrtec.         In the ED, she was found to be hemodynamically stable, labs WNL, CTH negative. She was given 50 IV Benadryl, 125 IV Solumedrol, 10 IV Reglan, 100 mg Sumatriptan x2, 250 IV Valproic Acid and 1L NS bolus. No neurological deficits noted. Patient admitted to medicine for further management.         Upon admission, patient's Oxycodone home dose resume and pain improved significantly. Patient remained with no focal neurological deficits throughout admission. On 5/20, the patient, case discussed with Dr. Flores and patient medically stable and clear for discharge home.         ISTOP # [id number]

## 2019-05-20 NOTE — PROGRESS NOTE ADULT - PROBLEM SELECTOR PLAN 2
- continue treatment as above  - Patient to have close F/U with outpatient pain management doctor upon 
- continue treatment as above  - Patient to have close F/U with outpatient pain management doctor upon DC

## 2019-05-20 NOTE — DISCHARGE NOTE NURSING/CASE MANAGEMENT/SOCIAL WORK - NSDCDPATPORTLINK_GEN_ALL_CORE
You can access the [facility] Patient Portal, offered by [facility], by registering with the following website: [email or web address]

## 2019-05-20 NOTE — PROGRESS NOTE ADULT - SUBJECTIVE AND OBJECTIVE BOX
45 yo F with a PMH of spinal stenosis (s/p trauma), chronic pain syndrome and chronic HAs who is presenting with a positional HA since epidural 2 days PTA admitted for further management.         Patient seen and examined at the bedside   She still feels headaches  However she stated that this regimen is helping her  Labs reviewed no labs from today  CT scan negative for any acute stroke    MEDICATIONS  (STANDING):  cyclobenzaprine 10 milliGRAM(s) Oral three times a day  gabapentin 800 milliGRAM(s) Oral three times a day    MEDICATIONS  (PRN):  aluminum hydroxide/magnesium hydroxide/simethicone Suspension 30 milliLiter(s) Oral every 6 hours PRN Dyspepsia  oxyCODONE    IR 10 milliGRAM(s) Oral every 6 hours PRN for SEVERE PAIN      Vital Signs Last 24 Hrs  T(C): 36.6 (19 May 2019 10:00), Max: 37.4 (18 May 2019 18:05)  T(F): 97.9 (19 May 2019 10:00), Max: 99.3 (18 May 2019 18:05)  HR: 80 (19 May 2019 10:00) (73 - 98)  BP: 112/71 (19 May 2019 10:00) (100/58 - 117/75)  BP(mean): --  RR: 16 (19 May 2019 10:00) (16 - 18)  SpO2: 98% (19 May 2019 10:00) (96% - 98%)    GENERAL: Moderate distress, cooperative with exam  HEAD:  Atraumatic, Normocephalic  EYES: EOMI, PERRLA, conjunctiva and sclera clear  NECK: Supple, No elevated JVD, tender to palpation posterior neck with muscle spasm  CHEST/LUNG: Clear to auscultation bilaterally; No wheeze  HEART: Regular rate and rhythm; No murmurs, rubs, or gallops  ABDOMEN: Soft, Nontender, Nondistended; Bowel sounds present  EXTREMITIES:  2+ Peripheral Pulses, No clubbing, cyanosis, or edema  PSYCH: AAOx3  NEUROLOGY: CN II-XII grossly intact, some diminished sensation Rt V1, moving all extremities  SKIN: No rashes or lesions      no labs today
Called to evaluate pt for potential post-dural puncture headache  Pt is a 43 yo female with scoliosis, MS, spinal stenosis who had an outpatient epidural steroid injection on theresa  The pt complained of immediate HA during the procedure.  The pain interventionalist then attempted an immediate blood patch per the patient.  The patient c/o positional HA but has only taken 1 fiorecet for pain and attended class today (albeit in pain)  No neurologic sequalae (visual changes, aura, etc), only chronic lower extremity neurologic changes  On exam, pt has significant scoliosis and tenderness to the lumbar spine.    Plan-  - Consider ct scan as HA sounds more consistent with pneumocephalus then true PDPH given immediate onset and no relief with first epidural blood patch  - If concern for epidural abcess given tenderness and (albeit chronic) neurologic changes of lower extremity, consider MRI of lumbar spine (low suspicion given afebrile, no new neurologic sequela, etc)  - maximize medical therapy (on gabapentin and fiorecet, encourage po fluids, caffeine, consider abdominal binder, bed rest)  - pt to f/u with pain interventionalist on Tues.  If repeat epidural blood patch is needed, floroscopy guidence in the clinic would be more appropriate then a blind procedure in the ED
Neurology Progress    RACHELLE [id number]    HPI:  HPI:  45 yo F with a PMH of spinal stenosis (s/p trauma), chronic pain syndrome and chronic HAs who is presenting with a positional HA since epidural 2 days PTA. Pt notes she was getting epidural for back pain, immediately afterwards pt sat up and felt a throbbing diffuse 10/10 HA leading her to scream in pain uncontrollably. Pt then had a blood patch placed during the same procedure for resumed CSF leak HA. Since then pt has had the same HA which is still positional (sitting/standing up) despite drinking more water and caffeine, as per her anesthesiologist. Pt also notes taking Tylenol, Fioricet and Zyrtec without any relief. Pt notes that this HA is significantly worse and different than her baseline HAs, which are daily. States the pain radiates from her shoulders/neck to her whole head. She denies any fevers, chills, dizziness, new numbness/tingling/weakness, changes in vision or speech, N/V.     In the ED, she was found to be hemodynamically stable, labs WNL, CTH negative. She was given 50 IV benadryl, 125 IV solumedrol, 10 IV reglan, 100 mg sumatriptan x2, 250 IV valproic acid, 1L NS bolus, admitted to medicine for further management.     PAST MEDICAL & SURGICAL HISTORY:  No pertinent past medical history  No significant past surgical history      Review of Systems:   CONSTITUTIONAL: No fever, weight loss, or fatigue  EYES: No eye pain, visual disturbances, or discharge  ENMT:  No difficulty hearing, tinnitus, vertigo; No sinus or throat pain  NECK: + pain or stiffness  BREASTS: No pain, masses, or nipple discharge  RESPIRATORY: No cough, wheezing, chills or hemoptysis; No shortness of breath  CARDIOVASCULAR: No chest pain, palpitations, dizziness, or leg swelling  GASTROINTESTINAL: No abdominal or epigastric pain. No nausea, vomiting, or hematemesis; No diarrhea or constipation. No melena or hematochezia.  GENITOURINARY: No dysuria, frequency, hematuria, or incontinence  NEUROLOGICAL: No headaches, memory loss, loss of strength, numbness, or tremors  SKIN: No itching, burning, rashes, or lesions   LYMPH NODES: No enlarged glands  ENDOCRINE: No heat or cold intolerance; No hair loss  MUSCULOSKELETAL: No joint pain or swelling; + muscle/ back pain  PSYCHIATRIC: No depression, anxiety, mood swings, or difficulty sleeping  HEME/LYMPH: No easy bruising, or bleeding gums  ALLERGY AND IMMUNOLOGIC: No hives or eczema    Allergies    naproxen (Anaphylaxis)    Intolerances        Social History:   She denies smoking/alcohol, illicit substance use.    FAMILY HISTORY:      MEDICATIONS  (STANDING):    MEDICATIONS  (PRN):      T(C): 37.1 (05-18-19 @ 06:04), Max: 37.1 (05-18-19 @ 06:04)  HR: 89 (05-18-19 @ 06:04) (68 - 89)  BP: 112/73 (05-18-19 @ 06:04) (100/55 - 131/79)  RR: 16 (05-18-19 @ 06:04) (16 - 18)  SpO2: 98% (05-18-19 @ 06:04) (95% - 100%)  Height (cm): 170.18 (05-18-19 @ 02:00)  Weight (kg): 83.12728566 (05-18-19 @ 02:00)  BMI (kg/m2): 29 (05-18-19 @ 02:00)  BSA (m2): 1.96 (05-18-19 @ 02:00)  CAPILLARY BLOOD GLUCOSE        I&O's Summary    17 May 2019 07:01  -  18 May 2019 07:00  --------------------------------------------------------  IN: 600 mL / OUT: 400 mL / NET: 200 mL        PHYSICAL EXAM:  GENERAL: Moderate distress, cooperative with exam  HEAD:  Atraumatic, Normocephalic  EYES: EOMI, PERRLA, conjunctiva and sclera clear  NECK: Supple, No elevated JVD, tender to palpation posterior neck with muscle spasm  CHEST/LUNG: Clear to auscultation bilaterally; No wheeze  HEART: Regular rate and rhythm; No murmurs, rubs, or gallops  ABDOMEN: Soft, Nontender, Nondistended; Bowel sounds present  EXTREMITIES:  2+ Peripheral Pulses, No clubbing, cyanosis, or edema  PSYCH: AAOx3  NEUROLOGY: CN II-XII grossly intact, some diminished sensation Rt V1, moving all extremities  SKIN: No rashes or lesions    LABS:                        13.9   10.33 )-----------( 234      ( 17 May 2019 16:45 )             43.0     05-17    140  |  104  |  12  ----------------------------<  95  4.0   |  23  |  0.67    Ca    9.9      17 May 2019 16:37    TPro  7.8  /  Alb  4.4  /  TBili  0.2  /  DBili  x   /  AST  12  /  ALT  9   /  AlkPhos  52  05-17    PT/INR - ( 17 May 2019 16:45 )   PT: 12.6 SEC;   INR: 1.13          PTT - ( 17 May 2019 16:45 )  PTT:28.1 SEC            RADIOLOGY & ADDITIONAL TESTS:    ECG Personally Reviewed -     Imaging Personally Reviewed: Reviewed CT with radiologist, WNL    Consultant(s) Notes Reviewed:      Care Discussed with Consultants/Other Providers: (18 May 2019 08:25)      Past Medical History  No pertinent past medical history      Past Surgical History  No significant past surgical history      MEDICATIONS    cyclobenzaprine 10 milliGRAM(s) Oral three times a day  gabapentin 800 milliGRAM(s) Oral three times a day  traMADol 25 milliGRAM(s) Oral every 4 hours PRN         Family history: No history of dementia, strokes, or seizures   FAMILY HISTORY:    SOCIAL HISTORY -- No history of tobacco or alcohol use     Allergies    naproxen (Anaphylaxis)    Intolerances        Height (cm): 170.18 (05-18 @ 02:00)  Weight (kg): 83.95982825 (05-18 @ 02:00)  BMI (kg/m2): 29 (05-18 @ 02:00)    Vital Signs Last 24 Hrs  T(C): 37.1 (18 May 2019 13:53), Max: 37.1 (18 May 2019 06:04)  T(F): 98.7 (18 May 2019 13:53), Max: 98.8 (18 May 2019 06:04)  HR: 87 (18 May 2019 13:53) (68 - 92)  BP: 113/73 (18 May 2019 13:53) (112/73 - 131/79)  BP(mean): --  RR: 18 (18 May 2019 13:53) (16 - 18)  SpO2: 97% (18 May 2019 13:53) (95% - 98%)        On Neurological Examination:    Mental Status - Patient is alert, awake, oriented X3. .   Follows commands well and able to answer questions appropriately. Mood and affect  normal  Follow simple commands able to repeat  able to name.  Speech - Fluent no Dysarthria  no  Aphasia                              Cranial Nerves - Extraocular muscle intact  LILO Facial symmetry Tongue midline, CnV1to V3 intact gross hearing intact      Motor Exam -   Right upper  5/5 throughout  Left upper 5/5 throughtout  Right lower- 5/5 throughout  Left lower 5/5 throughout  Coordination -finger to nose intact  Muscle tone - is normal all over. No asymmetry is seen.      Sensory    Bilateral intact to light touch    GENERAL Exam:     Nontoxic , No Acute Distress   	  HEENT:  normocephalic, atraumatic  		  LUNGS:	Clear bilaterally  No Wheeze      VASCULAR: no carotid brui  	  HEART:	 Normal S1S2   No murmur RRR        	  MUSCULOSKELETAL: Normal Range of Motion  	   SKIN:      Normal   No Ecchymosis               LABS:  CBC Full  -  ( 17 May 2019 16:45 )  WBC Count : 10.33 K/uL  RBC Count : 4.65 M/uL  Hemoglobin : 13.9 g/dL  Hematocrit : 43.0 %  Platelet Count - Automated : 234 K/uL  Mean Cell Volume : 92.5 fL  Mean Cell Hemoglobin : 29.9 pg  Mean Cell Hemoglobin Concentration : 32.3 %  Auto Neutrophil # : 8.71 K/uL  Auto Lymphocyte # : 1.32 K/uL  Auto Monocyte # : 0.25 K/uL  Auto Eosinophil # : 0.00 K/uL  Auto Basophil # : 0.01 K/uL  Auto Neutrophil % : 84.3 %  Auto Lymphocyte % : 12.8 %  Auto Monocyte % : 2.4 %  Auto Eosinophil % : 0.0 %  Auto Basophil % : 0.1 %      05-17    140  |  104  |  12  ----------------------------<  95  4.0   |  23  |  0.67    Ca    9.9      17 May 2019 16:37    TPro  7.8  /  Alb  4.4  /  TBili  0.2  /  DBili  x   /  AST  12  /  ALT  9   /  AlkPhos  52  05-17    Hemoglobin A1C:     LIVER FUNCTIONS - ( 17 May 2019 16:37 )  Alb: 4.4 g/dL / Pro: 7.8 g/dL / ALK PHOS: 52 u/L / ALT: 9 u/L / AST: 12 u/L / GGT: x           Vitamin B12   PT/INR - ( 17 May 2019 16:45 )   PT: 12.6 SEC;   INR: 1.13          PTT - ( 17 May 2019 16:45 )  PTT:28.1 SEC      RADIOLOGY    EKG      IMPRESSION  This is a  year old           schoenberg 
Patient is a 44y old  Female who presents with a chief complaint of headache (20 May 2019 13:06)      SUBJECTIVE / OVERNIGHT EVENTS: No acute events overnight. Reports pain is now improved.     MEDICATIONS  (STANDING):  cyclobenzaprine 10 milliGRAM(s) Oral three times a day  gabapentin 800 milliGRAM(s) Oral three times a day    MEDICATIONS  (PRN):  aluminum hydroxide/magnesium hydroxide/simethicone Suspension 30 milliLiter(s) Oral every 6 hours PRN Dyspepsia  oxyCODONE    IR 10 milliGRAM(s) Oral every 6 hours PRN for SEVERE PAIN      T(C): 37.2 (05-20-19 @ 10:54), Max: 37.2 (05-20-19 @ 10:54)  HR: 96 (05-20-19 @ 10:54) (69 - 96)  BP: 122/77 (05-20-19 @ 10:54) (106/67 - 130/71)  RR: 16 (05-20-19 @ 10:54) (16 - 18)  SpO2: 97% (05-20-19 @ 10:54) (96% - 98%)  CAPILLARY BLOOD GLUCOSE    I&O's Summary    PHYSICAL EXAM:  GENERAL: no apparent distress, on room air  EYES: sclera clear b/l  CHEST/LUNG: Clear to auscultation bilaterally; No wheezing or crackles  HEART: Regular rate and rhythm; No murmurs, rubs, or gallops  ABDOMEN: Soft, Nontender, Nondistended; Bowel sounds present  EXTREMITIES:  WWP  NEUROLOGY: awake, alert, responds to Qs appropriately    LABS:          RADIOLOGY & ADDITIONAL TESTS:

## 2019-05-20 NOTE — PROGRESS NOTE ADULT - PROBLEM SELECTOR PLAN 1
s/p blood patch for potential post-dural puncture headache,   - s/p sumatriptan as per neuro  - continue cyclobenzaprine 10 mg TID  - oxy as needed  - gabapentin 800 mg TID  - continue adequate hydration and caffeine intake  - f/u with pain interventionalist on Tues, if repeat epidural blood patch is needed, floroscopy guidence in the clinic recommended  - Neuro and Anesthesia recs appreciated
s/p blood patch for potential post-dural puncture headache,   - s/p sumatriptan as per neuro  - continue cyclobenzaprine 10 mg TID, oxy as needed, gabapentin 800 mg TID  - continue adequate hydration and caffeine intake  - f/u with pain interventionalist on Tues, if repeat epidural blood patch is needed, fluoroscopy guidance in the clinic recommended  - Neuro and Anesthesia recs appreciated

## 2019-05-20 NOTE — DISCHARGE NOTE PROVIDER - PROVIDER TOKENS
FREE:[LAST:[Manpreet],FIRST:[Nam],PHONE:[phone]],FAX:[(   )    -],ADDRESS:[Your primary care physician.]] FREE:[LAST:[Manpreet],FIRST:[Nam],PHONE:[phone]],FAX:[(   )    -],ADDRESS:[Your primary care physician.]],FREE:[LAST:[Geremias],FIRST:[Haresh],PHONE:[phone]],FAX:[(   )    -]]

## 2019-07-22 ENCOUNTER — OUTPATIENT (OUTPATIENT)
Dept: OUTPATIENT SERVICES | Facility: HOSPITAL | Age: 44
LOS: 1 days | End: 2019-07-22

## 2019-07-22 VITALS
HEIGHT: 67 IN | TEMPERATURE: 97 F | DIASTOLIC BLOOD PRESSURE: 70 MMHG | HEART RATE: 80 BPM | WEIGHT: 179.9 LBS | RESPIRATION RATE: 16 BRPM | SYSTOLIC BLOOD PRESSURE: 100 MMHG

## 2019-07-22 DIAGNOSIS — Z98.82 BREAST IMPLANT STATUS: Chronic | ICD-10-CM

## 2019-07-22 DIAGNOSIS — N93.9 ABNORMAL UTERINE AND VAGINAL BLEEDING, UNSPECIFIED: ICD-10-CM

## 2019-07-22 DIAGNOSIS — Z98.51 TUBAL LIGATION STATUS: Chronic | ICD-10-CM

## 2019-07-22 LAB
ANION GAP SERPL CALC-SCNC: 14 MMO/L — SIGNIFICANT CHANGE UP (ref 7–14)
BUN SERPL-MCNC: 11 MG/DL — SIGNIFICANT CHANGE UP (ref 7–23)
CALCIUM SERPL-MCNC: 9.9 MG/DL — SIGNIFICANT CHANGE UP (ref 8.4–10.5)
CHLORIDE SERPL-SCNC: 102 MMOL/L — SIGNIFICANT CHANGE UP (ref 98–107)
CO2 SERPL-SCNC: 25 MMOL/L — SIGNIFICANT CHANGE UP (ref 22–31)
CREAT SERPL-MCNC: 0.65 MG/DL — SIGNIFICANT CHANGE UP (ref 0.5–1.3)
GLUCOSE SERPL-MCNC: 85 MG/DL — SIGNIFICANT CHANGE UP (ref 70–99)
HCT VFR BLD CALC: 41.4 % — SIGNIFICANT CHANGE UP (ref 34.5–45)
HGB BLD-MCNC: 13.4 G/DL — SIGNIFICANT CHANGE UP (ref 11.5–15.5)
MCHC RBC-ENTMCNC: 29.8 PG — SIGNIFICANT CHANGE UP (ref 27–34)
MCHC RBC-ENTMCNC: 32.4 % — SIGNIFICANT CHANGE UP (ref 32–36)
MCV RBC AUTO: 92 FL — SIGNIFICANT CHANGE UP (ref 80–100)
NRBC # FLD: 0 K/UL — SIGNIFICANT CHANGE UP (ref 0–0)
PLATELET # BLD AUTO: 214 K/UL — SIGNIFICANT CHANGE UP (ref 150–400)
PMV BLD: 12.6 FL — SIGNIFICANT CHANGE UP (ref 7–13)
POTASSIUM SERPL-MCNC: 3.2 MMOL/L — LOW (ref 3.5–5.3)
POTASSIUM SERPL-SCNC: 3.2 MMOL/L — LOW (ref 3.5–5.3)
RBC # BLD: 4.5 M/UL — SIGNIFICANT CHANGE UP (ref 3.8–5.2)
RBC # FLD: 12.8 % — SIGNIFICANT CHANGE UP (ref 10.3–14.5)
SODIUM SERPL-SCNC: 141 MMOL/L — SIGNIFICANT CHANGE UP (ref 135–145)
WBC # BLD: 7.23 K/UL — SIGNIFICANT CHANGE UP (ref 3.8–10.5)
WBC # FLD AUTO: 7.23 K/UL — SIGNIFICANT CHANGE UP (ref 3.8–10.5)

## 2019-07-22 RX ORDER — SODIUM CHLORIDE 9 MG/ML
1000 INJECTION, SOLUTION INTRAVENOUS
Refills: 0 | Status: DISCONTINUED | OUTPATIENT
Start: 2019-08-01 | End: 2019-08-16

## 2019-07-22 NOTE — H&P PST ADULT - ANESTHESIA, PREVIOUS REACTION, PROFILE
"after I received an epidural injection for back pain, I had a severe headache 5 days and it lasted for weeks"

## 2019-07-22 NOTE — H&P PST ADULT - NEGATIVE NEUROLOGICAL SYMPTOMS
no paresthesias/no generalized seizures/no transient paralysis/no weakness/no tremors/no vertigo/no loss of sensation

## 2019-07-22 NOTE — H&P PST ADULT - HISTORY OF PRESENT ILLNESS
43 yo female with preop dx. abnormal uterine and vaginal bleeding presents to pre surgical testing.  Pt reports she has been experiencing menorrhagia and prolonged menses since 12/2018, no abnormal findings on imaging. Pt is scheduled for dilation curettage hysteroscopy, hydro thermal ablation on 8/1/19.

## 2019-07-22 NOTE — H&P PST ADULT - MUSCULOSKELETAL
details… detailed exam no joint warmth/no joint swelling/no joint erythema/ROM intact/no calf tenderness/normal strength

## 2019-07-22 NOTE — H&P PST ADULT - ENT GEN HX ROS MEA POS PC
"diagnosed with laryngitis 1 week ago"/throat pain "diagnosed with laryngitis 1 week ago, its not getting any better"/throat pain

## 2019-07-22 NOTE — H&P PST ADULT - NSICDXPASTMEDICALHX_GEN_ALL_CORE_FT
PAST MEDICAL HISTORY:  Abnormal uterine and vaginal bleeding     Chronic back pain     Heart murmur     Migraines

## 2019-07-22 NOTE — H&P PST ADULT - NSICDXPROBLEM_GEN_ALL_CORE_FT
PROBLEM DIAGNOSES  Problem: Abnormal uterine and vaginal bleeding  Assessment and Plan: Pt is scheduled for dilation curettage hysteroscopy, hydro thermal ablation on 8/1/19.   Verbal and written pre op instructions reviewed with patient and pt able to verbalize understanding.   Verbal and written instructions given with chlorhexidine wash, pt able to verbalize understanding via teach back method.   Pt instructed to obtain medical eval d/t a diagnosis of laryngitis with no improvement of symptoms x 1 week with yellow/green sputum production, pt able to verbalize understanding.  Surgeon notified via email.

## 2019-07-22 NOTE — H&P PST ADULT - NSANTHOSAYNRD_GEN_A_CORE
No. LAXMI screening performed.  STOP BANG Legend: 0-2 = LOW Risk; 3-4 = INTERMEDIATE Risk; 5-8 = HIGH Risk

## 2019-07-22 NOTE — H&P PST ADULT - NEGATIVE MUSCULOSKELETAL SYMPTOMS
no stiffness/no arm pain R/no leg pain R/no arm pain L/no joint swelling/no muscle weakness/no muscle cramps/no arthralgia/no myalgia/no leg pain L

## 2019-07-31 ENCOUNTER — TRANSCRIPTION ENCOUNTER (OUTPATIENT)
Age: 44
End: 2019-07-31

## 2019-08-01 ENCOUNTER — RESULT REVIEW (OUTPATIENT)
Age: 44
End: 2019-08-01

## 2019-08-01 ENCOUNTER — OUTPATIENT (OUTPATIENT)
Dept: OUTPATIENT SERVICES | Facility: HOSPITAL | Age: 44
LOS: 1 days | Discharge: HOME | End: 2019-08-01
Payer: SELF-PAY

## 2019-08-01 VITALS
WEIGHT: 179.9 LBS | HEART RATE: 68 BPM | DIASTOLIC BLOOD PRESSURE: 65 MMHG | RESPIRATION RATE: 14 BRPM | TEMPERATURE: 98 F | HEIGHT: 67 IN | SYSTOLIC BLOOD PRESSURE: 103 MMHG | OXYGEN SATURATION: 99 %

## 2019-08-01 VITALS — OXYGEN SATURATION: 98 % | HEART RATE: 88 BPM | RESPIRATION RATE: 14 BRPM

## 2019-08-01 DIAGNOSIS — Z98.51 TUBAL LIGATION STATUS: Chronic | ICD-10-CM

## 2019-08-01 DIAGNOSIS — N93.9 ABNORMAL UTERINE AND VAGINAL BLEEDING, UNSPECIFIED: ICD-10-CM

## 2019-08-01 DIAGNOSIS — Z98.82 BREAST IMPLANT STATUS: Chronic | ICD-10-CM

## 2019-08-01 PROCEDURE — 88305 TISSUE EXAM BY PATHOLOGIST: CPT | Mod: 26

## 2019-08-01 RX ORDER — FENTANYL CITRATE 50 UG/ML
25 INJECTION INTRAVENOUS
Refills: 0 | Status: DISCONTINUED | OUTPATIENT
Start: 2019-08-01 | End: 2019-08-01

## 2019-08-01 RX ORDER — OXYCODONE HYDROCHLORIDE 5 MG/1
10 TABLET ORAL ONCE
Refills: 0 | Status: DISCONTINUED | OUTPATIENT
Start: 2019-08-01 | End: 2019-08-01

## 2019-08-01 RX ORDER — ONDANSETRON 8 MG/1
4 TABLET, FILM COATED ORAL ONCE
Refills: 0 | Status: DISCONTINUED | OUTPATIENT
Start: 2019-08-01 | End: 2019-08-16

## 2019-08-01 RX ORDER — HYDROMORPHONE HYDROCHLORIDE 2 MG/ML
0.5 INJECTION INTRAMUSCULAR; INTRAVENOUS; SUBCUTANEOUS
Refills: 0 | Status: DISCONTINUED | OUTPATIENT
Start: 2019-08-01 | End: 2019-08-01

## 2019-08-01 RX ORDER — HYDROMORPHONE HYDROCHLORIDE 2 MG/ML
1 INJECTION INTRAMUSCULAR; INTRAVENOUS; SUBCUTANEOUS
Refills: 0 | Status: DISCONTINUED | OUTPATIENT
Start: 2019-08-01 | End: 2019-08-01

## 2019-08-01 RX ORDER — FENTANYL CITRATE 50 UG/ML
50 INJECTION INTRAVENOUS
Refills: 0 | Status: DISCONTINUED | OUTPATIENT
Start: 2019-08-01 | End: 2019-08-01

## 2019-08-01 RX ORDER — SODIUM CHLORIDE 9 MG/ML
500 INJECTION, SOLUTION INTRAVENOUS ONCE
Refills: 0 | Status: COMPLETED | OUTPATIENT
Start: 2019-08-01 | End: 2019-08-01

## 2019-08-01 RX ADMIN — HYDROMORPHONE HYDROCHLORIDE 1 MILLIGRAM(S): 2 INJECTION INTRAMUSCULAR; INTRAVENOUS; SUBCUTANEOUS at 17:15

## 2019-08-01 RX ADMIN — SODIUM CHLORIDE 2000 MILLILITER(S): 9 INJECTION, SOLUTION INTRAVENOUS at 15:52

## 2019-08-01 RX ADMIN — FENTANYL CITRATE 50 MICROGRAM(S): 50 INJECTION INTRAVENOUS at 14:01

## 2019-08-01 RX ADMIN — OXYCODONE HYDROCHLORIDE 10 MILLIGRAM(S): 5 TABLET ORAL at 15:00

## 2019-08-01 RX ADMIN — FENTANYL CITRATE 50 MICROGRAM(S): 50 INJECTION INTRAVENOUS at 13:45

## 2019-08-01 RX ADMIN — FENTANYL CITRATE 50 MICROGRAM(S): 50 INJECTION INTRAVENOUS at 14:16

## 2019-08-01 RX ADMIN — HYDROMORPHONE HYDROCHLORIDE 1 MILLIGRAM(S): 2 INJECTION INTRAMUSCULAR; INTRAVENOUS; SUBCUTANEOUS at 16:41

## 2019-08-01 RX ADMIN — FENTANYL CITRATE 50 MICROGRAM(S): 50 INJECTION INTRAVENOUS at 13:30

## 2019-08-01 RX ADMIN — OXYCODONE HYDROCHLORIDE 10 MILLIGRAM(S): 5 TABLET ORAL at 14:29

## 2019-08-01 NOTE — ASU DISCHARGE PLAN (ADULT/PEDIATRIC) - CALL YOUR DOCTOR IF YOU HAVE ANY OF THE FOLLOWING:
Fever greater than (need to indicate Fahrenheit or Celsius)/Numbness, tingling, color or temperature change to extremity/Wound/Surgical Site with redness, or foul smelling discharge or pus/Unable to urinate/Increased irritability or sluggishness/Inability to tolerate liquids or foods/Excessive diarrhea/Nausea and vomiting that does not stop Nausea and vomiting that does not stop/Wound/Surgical Site with redness, or foul smelling discharge or pus/Excessive diarrhea/Fever greater than (need to indicate Fahrenheit or Celsius)/Pain not relieved by Medications/Bleeding that does not stop/Unable to urinate/Inability to tolerate liquids or foods/Increased irritability or sluggishness/Numbness, tingling, color or temperature change to extremity

## 2019-08-01 NOTE — ASU DISCHARGE PLAN (ADULT/PEDIATRIC) - CARE PROVIDER_API CALL
Kohanim, Behnam (MD)  Obstetrics and Gynecology  [address], [address]  [place] [address]  Phone: [phone]  Fax: [phone]  Follow Up Time: 2 weeks

## 2019-08-01 NOTE — BRIEF OPERATIVE NOTE - NSICDXBRIEFPROCEDURE_GEN_ALL_CORE_FT
PROCEDURES:  Hysteroscopy, with D&C of uterus and endometrium hydrothermal ablation 01-Aug-2019 13:15:38  Dave Hendrickson

## 2019-08-01 NOTE — ASU DISCHARGE PLAN (ADULT/PEDIATRIC) - PROCEDURE
D&C Hysteroscopy with HTA D&C Hysteroscopy with Hydrothermal ablation D&C Hysteroscopy with hydrothermal ablation

## 2019-08-01 NOTE — CHART NOTE - NSCHARTNOTEFT_GEN_A_CORE
R1 GYN Chart Note    S: 43yo POD#0 s/p D&C w/ Hydrothermal Ablation. Patient seen and evaluated by me at bedside due to complaining of pain. Patient states the pain is in her lower abdomen. Feels like a contraction. It is relieved by hot packs. She also received fentanyl, oxycodone and Dilaudid prior to me seeing her. Otherwise is doing well. Denies headaches, N/V, CP, SOB.     O:  Vitals:  Vital Signs Last 24 Hrs  T(C): 36.6 (01 Aug 2019 16:00), Max: 36.6 (01 Aug 2019 13:05)  T(F): 97.9 (01 Aug 2019 16:00), Max: 97.9 (01 Aug 2019 13:05)  HR: 90 (01 Aug 2019 16:30) (65 - 90)  BP: 119/79 (01 Aug 2019 16:30) (96/53 - 121/82)  BP(mean): 85 (01 Aug 2019 14:15) (85 - 85)  RR: 17 (01 Aug 2019 16:30) (14 - 20)  SpO2: 99% (01 Aug 2019 16:30) (95% - 100%)      Physical Exam:  General: NAD  CV: RRR w/o R/M/G  Lungs: CTA bl  Abdomen: soft, tender to palpation, non-distended,  Vaginal: No bleeding on pad  Extremities: No erythema/edema    A/P: 43yo POD#0 s/p D&C w/ Hydrothermal Ablation. Patients pain is appropriate for procedure but may be exacerbated due to her not taking her chronic pain medication this morning. She normally takes 7.5mg of Oxycodone at home TID. Patient will be discharge with Percocet 5-325, 2 day supply.  - Plan to f/u with  outpatient      d/w Dr. Luc Hendrickson PGY1

## 2019-08-05 LAB — SURGICAL PATHOLOGY STUDY: SIGNIFICANT CHANGE UP

## 2021-02-19 ENCOUNTER — NON-APPOINTMENT (OUTPATIENT)
Age: 46
End: 2021-02-19

## 2021-02-19 PROBLEM — Z00.00 ENCOUNTER FOR PREVENTIVE HEALTH EXAMINATION: Noted: 2021-02-19

## 2021-02-26 ENCOUNTER — APPOINTMENT (OUTPATIENT)
Dept: GASTROENTEROLOGY | Facility: CLINIC | Age: 46
End: 2021-02-26
Payer: COMMERCIAL

## 2021-02-26 VITALS
HEIGHT: 67 IN | SYSTOLIC BLOOD PRESSURE: 127 MMHG | WEIGHT: 163 LBS | DIASTOLIC BLOOD PRESSURE: 87 MMHG | HEART RATE: 93 BPM | BODY MASS INDEX: 25.58 KG/M2 | OXYGEN SATURATION: 98 % | TEMPERATURE: 97.7 F

## 2021-02-26 DIAGNOSIS — R13.10 DYSPHAGIA, UNSPECIFIED: ICD-10-CM

## 2021-02-26 DIAGNOSIS — K62.5 HEMORRHAGE OF ANUS AND RECTUM: ICD-10-CM

## 2021-02-26 DIAGNOSIS — Z20.822 CONTACT WITH AND (SUSPECTED) EXPOSURE TO COVID-19: ICD-10-CM

## 2021-02-26 DIAGNOSIS — Z78.9 OTHER SPECIFIED HEALTH STATUS: ICD-10-CM

## 2021-02-26 DIAGNOSIS — Z01.818 ENCOUNTER FOR OTHER PREPROCEDURAL EXAMINATION: ICD-10-CM

## 2021-02-26 DIAGNOSIS — Z83.3 FAMILY HISTORY OF DIABETES MELLITUS: ICD-10-CM

## 2021-02-26 DIAGNOSIS — Z87.828 PERSONAL HISTORY OF OTHER (HEALED) PHYSICAL INJURY AND TRAUMA: ICD-10-CM

## 2021-02-26 DIAGNOSIS — Z83.79 FAMILY HISTORY OF OTHER DISEASES OF THE DIGESTIVE SYSTEM: ICD-10-CM

## 2021-02-26 DIAGNOSIS — Z82.5 FAMILY HISTORY OF ASTHMA AND OTHER CHRONIC LOWER RESPIRATORY DISEASES: ICD-10-CM

## 2021-02-26 PROCEDURE — 36415 COLL VENOUS BLD VENIPUNCTURE: CPT

## 2021-02-26 PROCEDURE — 99072 ADDL SUPL MATRL&STAF TM PHE: CPT

## 2021-02-26 PROCEDURE — 99204 OFFICE O/P NEW MOD 45 MIN: CPT | Mod: 25

## 2021-02-26 RX ORDER — CETIRIZINE HCL 10 MG
TABLET ORAL
Refills: 0 | Status: ACTIVE | COMMUNITY

## 2021-02-26 RX ORDER — MAG HYDROX/ALUMINUM HYD/SIMETH 200-200-20
SUSPENSION, ORAL (FINAL DOSE FORM) ORAL
Refills: 0 | Status: ACTIVE | COMMUNITY

## 2021-02-26 RX ORDER — BUTALB/ACETAMINOPHEN/CAFFEINE 50-325-40
TABLET ORAL
Refills: 0 | Status: ACTIVE | COMMUNITY

## 2021-02-26 RX ORDER — SUCRALFATE 1 G/1
1 TABLET ORAL
Refills: 0 | Status: ACTIVE | COMMUNITY

## 2021-02-26 RX ORDER — OXYCODONE 10 MG/1
10 TABLET ORAL
Refills: 0 | Status: ACTIVE | COMMUNITY

## 2021-02-26 RX ORDER — RIMEGEPANT SULFATE 75 MG/75MG
75 TABLET, ORALLY DISINTEGRATING ORAL
Refills: 0 | Status: ACTIVE | COMMUNITY

## 2021-02-26 RX ORDER — CALCIUM CARBONATE 500 MG/1
TABLET, CHEWABLE ORAL
Refills: 0 | Status: ACTIVE | COMMUNITY

## 2021-02-26 RX ORDER — GABAPENTIN 300 MG/1
300 CAPSULE ORAL
Refills: 0 | Status: ACTIVE | COMMUNITY

## 2021-02-26 RX ORDER — CYCLOBENZAPRINE HCL 5 MG
TABLET ORAL
Refills: 0 | Status: ACTIVE | COMMUNITY

## 2021-02-28 LAB
ALBUMIN SERPL ELPH-MCNC: 5 G/DL
ALP BLD-CCNC: 63 U/L
ALT SERPL-CCNC: 9 U/L
ANION GAP SERPL CALC-SCNC: 14 MMOL/L
AST SERPL-CCNC: 14 U/L
BASOPHILS # BLD AUTO: 0.07 K/UL
BASOPHILS NFR BLD AUTO: 0.8 %
BILIRUB SERPL-MCNC: 0.2 MG/DL
BUN SERPL-MCNC: 13 MG/DL
CALCIUM SERPL-MCNC: 10.1 MG/DL
CHLORIDE SERPL-SCNC: 101 MMOL/L
CO2 SERPL-SCNC: 22 MMOL/L
CREAT SERPL-MCNC: 0.69 MG/DL
EOSINOPHIL # BLD AUTO: 0.16 K/UL
EOSINOPHIL NFR BLD AUTO: 1.9 %
FERRITIN SERPL-MCNC: 67 NG/ML
GGT SERPL-CCNC: 12 U/L
GLIADIN IGA SER QL: 6.2 UNITS
GLIADIN IGG SER QL: <5 UNITS
GLIADIN PEPTIDE IGA SER-ACNC: NEGATIVE
GLIADIN PEPTIDE IGG SER-ACNC: NEGATIVE
GLUCOSE SERPL-MCNC: 100 MG/DL
HCT VFR BLD CALC: 45.9 %
HGB BLD-MCNC: 15.2 G/DL
IGA SER QL IEP: 355 MG/DL
IMM GRANULOCYTES NFR BLD AUTO: 0.1 %
IRON SATN MFR SERPL: 15 %
IRON SERPL-MCNC: 51 UG/DL
LYMPHOCYTES # BLD AUTO: 2.38 K/UL
LYMPHOCYTES NFR BLD AUTO: 28.6 %
MAN DIFF?: NORMAL
MCHC RBC-ENTMCNC: 29.6 PG
MCHC RBC-ENTMCNC: 33.1 GM/DL
MCV RBC AUTO: 89.5 FL
MONOCYTES # BLD AUTO: 0.57 K/UL
MONOCYTES NFR BLD AUTO: 6.9 %
NEUTROPHILS # BLD AUTO: 5.13 K/UL
NEUTROPHILS NFR BLD AUTO: 61.7 %
PLATELET # BLD AUTO: 213 K/UL
POTASSIUM SERPL-SCNC: 3.8 MMOL/L
PROT SERPL-MCNC: 8 G/DL
RBC # BLD: 5.13 M/UL
RBC # FLD: 12.8 %
SODIUM SERPL-SCNC: 138 MMOL/L
TIBC SERPL-MCNC: 329 UG/DL
TSH SERPL-ACNC: 1.43 UIU/ML
TTG IGA SER IA-ACNC: <1.2 U/ML
TTG IGA SER-ACNC: NEGATIVE
TTG IGG SER IA-ACNC: 2.5 U/ML
TTG IGG SER IA-ACNC: NEGATIVE
UIBC SERPL-MCNC: 278 UG/DL
WBC # FLD AUTO: 8.32 K/UL

## 2021-02-28 NOTE — CONSULT LETTER
[FreeTextEntry1] : Dear Dr. Nam Case,\par \[facility] I had the pleasure of seeing your patient RACHELLE CACERES in the office today.  My office note is attached. PLEASE READ THE "ASSESSMENT" SECTION OF THE NOTE TO SEE MY IMPRESSION AND PLAN.\par \par Thank you very much for allowing me to participate in the care of your patient.\par \[facility] Sincerely,\[facility] \[facility] Dayron Morris M.D., FAC, [facility] Director, Celiac Program at [facility]  of Medicine\[employer] [facility] [employer] at [facility] Practice Director,\[facility] - Gastroenterology/Internal Medicine at [place]\[facility] [address]\[place] [address]\[facility] Tel: [phone]\[facility] Email: [email or web address]\[email or web address] \[facility] \[facility] The attached note has been created using a voice recognition system (Dragon).  There may be some misspellings and typos.  Please call my office if you have any issues or questions.
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
## 2021-02-28 NOTE — REASON FOR VISIT
[Initial Evaluation] : an initial evaluation [FreeTextEntry1] : Heartburn, dysphagia, constipation, rectal bleeding
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
## 2021-03-01 LAB
ENDOMYSIUM IGA SER QL: NEGATIVE
ENDOMYSIUM IGA TITR SER: NORMAL

## 2021-03-02 ENCOUNTER — NON-APPOINTMENT (OUTPATIENT)
Age: 46
End: 2021-03-02

## 2021-03-09 ENCOUNTER — OUTPATIENT (OUTPATIENT)
Dept: OUTPATIENT SERVICES | Facility: HOSPITAL | Age: 46
LOS: 1 days | End: 2021-03-09
Payer: COMMERCIAL

## 2021-03-09 VITALS
TEMPERATURE: 98 F | WEIGHT: 162.92 LBS | RESPIRATION RATE: 16 BRPM | HEIGHT: 67 IN | SYSTOLIC BLOOD PRESSURE: 102 MMHG | OXYGEN SATURATION: 98 % | DIASTOLIC BLOOD PRESSURE: 70 MMHG | HEART RATE: 74 BPM

## 2021-03-09 DIAGNOSIS — Z98.890 OTHER SPECIFIED POSTPROCEDURAL STATES: Chronic | ICD-10-CM

## 2021-03-09 DIAGNOSIS — N92.0 EXCESSIVE AND FREQUENT MENSTRUATION WITH REGULAR CYCLE: ICD-10-CM

## 2021-03-09 DIAGNOSIS — Z98.51 TUBAL LIGATION STATUS: Chronic | ICD-10-CM

## 2021-03-09 DIAGNOSIS — Z98.82 BREAST IMPLANT STATUS: Chronic | ICD-10-CM

## 2021-03-09 PROBLEM — R01.1 CARDIAC MURMUR, UNSPECIFIED: Chronic | Status: ACTIVE | Noted: 2019-07-22

## 2021-03-09 PROBLEM — G43.909 MIGRAINE, UNSPECIFIED, NOT INTRACTABLE, WITHOUT STATUS MIGRAINOSUS: Chronic | Status: ACTIVE | Noted: 2019-07-22

## 2021-03-09 PROBLEM — N93.9 ABNORMAL UTERINE AND VAGINAL BLEEDING, UNSPECIFIED: Chronic | Status: ACTIVE | Noted: 2019-07-22

## 2021-03-09 LAB
ALBUMIN SERPL ELPH-MCNC: 4.8 G/DL — SIGNIFICANT CHANGE UP (ref 3.3–5)
ALP SERPL-CCNC: 50 U/L — SIGNIFICANT CHANGE UP (ref 40–120)
ALT FLD-CCNC: 11 U/L — SIGNIFICANT CHANGE UP (ref 4–33)
ANION GAP SERPL CALC-SCNC: 12 MMOL/L — SIGNIFICANT CHANGE UP (ref 7–14)
AST SERPL-CCNC: 18 U/L — SIGNIFICANT CHANGE UP (ref 4–32)
BILIRUB SERPL-MCNC: 0.3 MG/DL — SIGNIFICANT CHANGE UP (ref 0.2–1.2)
BUN SERPL-MCNC: 8 MG/DL — SIGNIFICANT CHANGE UP (ref 7–23)
CALCIUM SERPL-MCNC: 10 MG/DL — SIGNIFICANT CHANGE UP (ref 8.4–10.5)
CHLORIDE SERPL-SCNC: 103 MMOL/L — SIGNIFICANT CHANGE UP (ref 98–107)
CO2 SERPL-SCNC: 26 MMOL/L — SIGNIFICANT CHANGE UP (ref 22–31)
CREAT SERPL-MCNC: 0.74 MG/DL — SIGNIFICANT CHANGE UP (ref 0.5–1.3)
GLUCOSE SERPL-MCNC: 93 MG/DL — SIGNIFICANT CHANGE UP (ref 70–99)
HCG UR QL: NEGATIVE — SIGNIFICANT CHANGE UP
HCT VFR BLD CALC: 44.7 % — SIGNIFICANT CHANGE UP (ref 34.5–45)
HGB BLD-MCNC: 14.5 G/DL — SIGNIFICANT CHANGE UP (ref 11.5–15.5)
MCHC RBC-ENTMCNC: 28.9 PG — SIGNIFICANT CHANGE UP (ref 27–34)
MCHC RBC-ENTMCNC: 32.4 GM/DL — SIGNIFICANT CHANGE UP (ref 32–36)
MCV RBC AUTO: 89 FL — SIGNIFICANT CHANGE UP (ref 80–100)
NRBC # BLD: 0 /100 WBCS — SIGNIFICANT CHANGE UP
NRBC # FLD: 0 K/UL — SIGNIFICANT CHANGE UP
PLATELET # BLD AUTO: 247 K/UL — SIGNIFICANT CHANGE UP (ref 150–400)
POTASSIUM SERPL-MCNC: 3.6 MMOL/L — SIGNIFICANT CHANGE UP (ref 3.5–5.3)
POTASSIUM SERPL-SCNC: 3.6 MMOL/L — SIGNIFICANT CHANGE UP (ref 3.5–5.3)
PROT SERPL-MCNC: 7.8 G/DL — SIGNIFICANT CHANGE UP (ref 6–8.3)
RBC # BLD: 5.02 M/UL — SIGNIFICANT CHANGE UP (ref 3.8–5.2)
RBC # FLD: 12.6 % — SIGNIFICANT CHANGE UP (ref 10.3–14.5)
SODIUM SERPL-SCNC: 141 MMOL/L — SIGNIFICANT CHANGE UP (ref 135–145)
WBC # BLD: 7.62 K/UL — SIGNIFICANT CHANGE UP (ref 3.8–10.5)
WBC # FLD AUTO: 7.62 K/UL — SIGNIFICANT CHANGE UP (ref 3.8–10.5)

## 2021-03-09 PROCEDURE — 93010 ELECTROCARDIOGRAM REPORT: CPT

## 2021-03-09 RX ORDER — SODIUM CHLORIDE 9 MG/ML
1000 INJECTION, SOLUTION INTRAVENOUS
Refills: 0 | Status: DISCONTINUED | OUTPATIENT
Start: 2021-03-19 | End: 2021-03-21

## 2021-03-09 RX ORDER — GABAPENTIN 400 MG/1
1 CAPSULE ORAL
Qty: 0 | Refills: 0 | DISCHARGE

## 2021-03-09 RX ORDER — HYDROCODONE BITARTRATE 50 MG/1
7.5 CAPSULE, EXTENDED RELEASE ORAL
Qty: 0 | Refills: 0 | DISCHARGE

## 2021-03-09 NOTE — H&P PST ADULT - NEGATIVE SKIN SYMPTOMS
no rash/no itching/no dryness/no change in size/color of mole/no tumor/no brittle nails/no pitted nails
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
## 2021-03-09 NOTE — H&P PST ADULT - NSANTHOSAYNRD_GEN_A_CORE
Denies sleep studies done in the past/No. LAXMI screening performed.  STOP BANG Legend: 0-2 = LOW Risk; 3-4 = INTERMEDIATE Risk; 5-8 = HIGH Risk

## 2021-03-09 NOTE — H&P PST ADULT - ASSESSMENT
DX: excessive and frequent menstruation with regula cycle and evaluated for a scheduled supracervical hysterectomy, removal of both tubes on 3/19/21

## 2021-03-09 NOTE — H&P PST ADULT - NEGATIVE GENERAL GENITOURINARY SYMPTOMS
no hematuria/no renal colic/no flank pain L/no flank pain R/no urine discoloration/no gas in urine/no incontinence/no dysuria/no urinary hesitancy/normal urinary frequency/no nocturia
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
## 2021-03-09 NOTE — H&P PST ADULT - NEGATIVE PSYCHIATRIC SYMPTOMS
no suicidal ideation/no depression/no anxiety/no insomnia/no memory loss/no paranoia/no mood swings/no agitation/no visual hallucinations/no auditory hallucinations/no hyperactivity

## 2021-03-09 NOTE — H&P PST ADULT - NEGATIVE MUSCULOSKELETAL SYMPTOMS
no arthralgia/no joint swelling/no myalgia/no muscle cramps/no muscle weakness/no stiffness/no arm pain L/no arm pain R/no leg pain L/no leg pain R

## 2021-03-09 NOTE — H&P PST ADULT - NEGATIVE ENMT SYMPTOMS
no hearing difficulty/no ear pain/no tinnitus/no vertigo/no sinus symptoms/no nasal congestion/no nasal obstruction/no post-nasal discharge/no nose bleeds/no recurrent cold sores/no abnormal taste sensation/no gum bleeding/no dry mouth/no throat pain/no dysphagia
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
## 2021-03-09 NOTE — H&P PST ADULT - NSICDXPASTSURGICALHX_GEN_ALL_CORE_FT
PAST SURGICAL HISTORY:  H/O breast augmentation 2015    H/O foot surgery b/l, correctional    H/O knee surgery left, torn meniscus 2020    H/O shoulder surgery 2020 left    H/O tubal ligation 2007

## 2021-03-09 NOTE — H&P PST ADULT - COMMENTS
2/9/2021 Denies current covid S&S or in the past, test neg when done. Pt denies history of travel outside the country and outside [place] and denies COVID19 positive contacts within the last 14 days.

## 2021-03-09 NOTE — H&P PST ADULT - NSICDXPROBLEM_GEN_ALL_CORE_FT
PROBLEM DIAGNOSES  Problem: Excessive and frequent menstruation  Assessment and Plan: Preop instructions provided including npo status, Hibiclens wash for infection control and GI prophylasix. Pt to c/w current meds, aware to stop any NSAIDS, OTC herbals or MVI on 3/12/21 . Verbilized understanding. BW done, pending results. DVT prophylasix as per primary team. Aware to f/u on covid testing prior to sx as per pst protocol and has an appt. Allergy norified to OR booking via fax.

## 2021-03-09 NOTE — H&P PST ADULT - BACK
ANXIETY    • Will report anxiety at manageable levels Progressing        CARDIOVASCULAR - ADULT    • Maintains optimal cardiac output and hemodynamic stability Progressing    • Absence of cardiac arrhythmias or at baseline Progressing        METABOLIC/FLUI detailed exam

## 2021-03-09 NOTE — H&P PST ADULT - NEGATIVE OPHTHALMOLOGIC SYMPTOMS
no diplopia/no photophobia/no lacrimation L/no lacrimation R/no blurred vision L/no blurred vision R/no discharge L/no discharge R/no pain L/no pain R/no irritation R/no loss of vision L/no loss of vision R/no scleral injection L/no scleral injection R

## 2021-03-09 NOTE — H&P PST ADULT - NEGATIVE NEUROLOGICAL SYMPTOMS
no transient paralysis/no weakness/no paresthesias/no generalized seizures/no focal seizures/no syncope/no tremors/no vertigo/no loss of sensation/no difficulty walking/no hemiparesis/no facial palsy

## 2021-03-16 ENCOUNTER — APPOINTMENT (OUTPATIENT)
Dept: DISASTER EMERGENCY | Facility: CLINIC | Age: 46
End: 2021-03-16

## 2021-03-17 LAB — SARS-COV-2 N GENE NPH QL NAA+PROBE: NOT DETECTED

## 2021-03-18 ENCOUNTER — TRANSCRIPTION ENCOUNTER (OUTPATIENT)
Age: 46
End: 2021-03-18

## 2021-03-18 NOTE — ASU PATIENT PROFILE, ADULT - PSH
H/O breast augmentation  2015  H/O foot surgery  b/l, correctional  H/O knee surgery  left, torn meniscus 2020  H/O shoulder surgery  2020 left  H/O tubal ligation  2007
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
## 2021-03-18 NOTE — ASU PATIENT PROFILE, ADULT - MUTUALITY COMMENT, PROFILE
Discussed with Pt her ability to have questions answered by the surgeon & anesthesiologist before going into the OR

## 2021-03-19 ENCOUNTER — APPOINTMENT (OUTPATIENT)
Dept: GASTROENTEROLOGY | Facility: AMBULATORY MEDICAL SERVICES | Age: 46
End: 2021-03-19

## 2021-03-19 ENCOUNTER — RESULT REVIEW (OUTPATIENT)
Age: 46
End: 2021-03-19

## 2021-03-19 ENCOUNTER — INPATIENT (INPATIENT)
Facility: HOSPITAL | Age: 46
LOS: 3 days | Discharge: HOME | End: 2021-03-23
Attending: OBSTETRICS & GYNECOLOGY | Admitting: OBSTETRICS & GYNECOLOGY
Payer: COMMERCIAL

## 2021-03-19 VITALS
RESPIRATION RATE: 14 BRPM | HEART RATE: 64 BPM | SYSTOLIC BLOOD PRESSURE: 105 MMHG | OXYGEN SATURATION: 100 % | DIASTOLIC BLOOD PRESSURE: 71 MMHG | HEIGHT: 67 IN | TEMPERATURE: 98 F | WEIGHT: 162.92 LBS

## 2021-03-19 DIAGNOSIS — Z98.890 OTHER SPECIFIED POSTPROCEDURAL STATES: Chronic | ICD-10-CM

## 2021-03-19 DIAGNOSIS — Z98.82 BREAST IMPLANT STATUS: Chronic | ICD-10-CM

## 2021-03-19 DIAGNOSIS — N92.0 EXCESSIVE AND FREQUENT MENSTRUATION WITH REGULAR CYCLE: ICD-10-CM

## 2021-03-19 DIAGNOSIS — Z98.51 TUBAL LIGATION STATUS: Chronic | ICD-10-CM

## 2021-03-19 LAB — HCG UR QL: NEGATIVE — SIGNIFICANT CHANGE UP

## 2021-03-19 PROCEDURE — 88307 TISSUE EXAM BY PATHOLOGIST: CPT | Mod: 26

## 2021-03-19 PROCEDURE — 88302 TISSUE EXAM BY PATHOLOGIST: CPT | Mod: 26

## 2021-03-19 RX ORDER — OXYCODONE HYDROCHLORIDE 5 MG/1
5 TABLET ORAL EVERY 4 HOURS
Refills: 0 | Status: DISCONTINUED | OUTPATIENT
Start: 2021-03-19 | End: 2021-03-20

## 2021-03-19 RX ORDER — PANTOPRAZOLE SODIUM 20 MG/1
1 TABLET, DELAYED RELEASE ORAL
Qty: 0 | Refills: 0 | DISCHARGE

## 2021-03-19 RX ORDER — SODIUM CHLORIDE 9 MG/ML
1000 INJECTION, SOLUTION INTRAVENOUS
Refills: 0 | Status: DISCONTINUED | OUTPATIENT
Start: 2021-03-19 | End: 2021-03-21

## 2021-03-19 RX ORDER — ONDANSETRON 8 MG/1
8 TABLET, FILM COATED ORAL EVERY 8 HOURS
Refills: 0 | Status: DISCONTINUED | OUTPATIENT
Start: 2021-03-19 | End: 2021-03-23

## 2021-03-19 RX ORDER — SIMETHICONE 80 MG/1
80 TABLET, CHEWABLE ORAL EVERY 6 HOURS
Refills: 0 | Status: DISCONTINUED | OUTPATIENT
Start: 2021-03-19 | End: 2021-03-23

## 2021-03-19 RX ORDER — HYDROMORPHONE HYDROCHLORIDE 2 MG/ML
0.5 INJECTION INTRAMUSCULAR; INTRAVENOUS; SUBCUTANEOUS
Refills: 0 | Status: DISCONTINUED | OUTPATIENT
Start: 2021-03-19 | End: 2021-03-22

## 2021-03-19 RX ORDER — GABAPENTIN 400 MG/1
300 CAPSULE ORAL THREE TIMES A DAY
Refills: 0 | Status: DISCONTINUED | OUTPATIENT
Start: 2021-03-19 | End: 2021-03-23

## 2021-03-19 RX ORDER — OXYCODONE HYDROCHLORIDE 5 MG/1
10 TABLET ORAL EVERY 4 HOURS
Refills: 0 | Status: DISCONTINUED | OUTPATIENT
Start: 2021-03-19 | End: 2021-03-20

## 2021-03-19 RX ORDER — ACETAMINOPHEN 500 MG
1000 TABLET ORAL EVERY 6 HOURS
Refills: 0 | Status: COMPLETED | OUTPATIENT
Start: 2021-03-19 | End: 2021-03-20

## 2021-03-19 RX ORDER — ACETAMINOPHEN 500 MG
975 TABLET ORAL EVERY 6 HOURS
Refills: 0 | Status: COMPLETED | OUTPATIENT
Start: 2021-03-19 | End: 2022-02-15

## 2021-03-19 RX ORDER — CYCLOBENZAPRINE HYDROCHLORIDE 10 MG/1
5 TABLET, FILM COATED ORAL
Refills: 0 | Status: DISCONTINUED | OUTPATIENT
Start: 2021-03-19 | End: 2021-03-20

## 2021-03-19 RX ORDER — HYDROMORPHONE HYDROCHLORIDE 2 MG/ML
0.5 INJECTION INTRAMUSCULAR; INTRAVENOUS; SUBCUTANEOUS
Refills: 0 | Status: DISCONTINUED | OUTPATIENT
Start: 2021-03-19 | End: 2021-03-19

## 2021-03-19 RX ORDER — ONDANSETRON 8 MG/1
4 TABLET, FILM COATED ORAL EVERY 6 HOURS
Refills: 0 | Status: COMPLETED | OUTPATIENT
Start: 2021-03-19 | End: 2021-03-20

## 2021-03-19 RX ORDER — HEPARIN SODIUM 5000 [USP'U]/ML
5000 INJECTION INTRAVENOUS; SUBCUTANEOUS EVERY 8 HOURS
Refills: 0 | Status: DISCONTINUED | OUTPATIENT
Start: 2021-03-20 | End: 2021-03-21

## 2021-03-19 RX ORDER — FENTANYL CITRATE 50 UG/ML
25 INJECTION INTRAVENOUS
Refills: 0 | Status: DISCONTINUED | OUTPATIENT
Start: 2021-03-19 | End: 2021-03-19

## 2021-03-19 RX ORDER — NALOXONE HYDROCHLORIDE 4 MG/.1ML
0.1 SPRAY NASAL
Refills: 0 | Status: DISCONTINUED | OUTPATIENT
Start: 2021-03-19 | End: 2021-03-22

## 2021-03-19 RX ORDER — CYCLOBENZAPRINE HYDROCHLORIDE 10 MG/1
1 TABLET, FILM COATED ORAL
Qty: 0 | Refills: 0 | DISCHARGE

## 2021-03-19 RX ORDER — FAMOTIDINE 10 MG/ML
20 INJECTION INTRAVENOUS DAILY
Refills: 0 | Status: DISCONTINUED | OUTPATIENT
Start: 2021-03-19 | End: 2021-03-23

## 2021-03-19 RX ORDER — HYDROMORPHONE HYDROCHLORIDE 2 MG/ML
30 INJECTION INTRAMUSCULAR; INTRAVENOUS; SUBCUTANEOUS
Refills: 0 | Status: DISCONTINUED | OUTPATIENT
Start: 2021-03-19 | End: 2021-03-20

## 2021-03-19 RX ORDER — ACETAMINOPHEN 500 MG
1000 TABLET ORAL EVERY 6 HOURS
Refills: 0 | Status: DISCONTINUED | OUTPATIENT
Start: 2021-03-19 | End: 2021-03-19

## 2021-03-19 RX ORDER — HEPARIN SODIUM 5000 [USP'U]/ML
5000 INJECTION INTRAVENOUS; SUBCUTANEOUS EVERY 12 HOURS
Refills: 0 | Status: DISCONTINUED | OUTPATIENT
Start: 2021-03-19 | End: 2021-03-19

## 2021-03-19 RX ORDER — RIMEGEPANT SULFATE 75 MG/75MG
1 TABLET, ORALLY DISINTEGRATING ORAL
Qty: 0 | Refills: 0 | DISCHARGE

## 2021-03-19 RX ADMIN — OXYCODONE HYDROCHLORIDE 10 MILLIGRAM(S): 5 TABLET ORAL at 19:50

## 2021-03-19 RX ADMIN — CYCLOBENZAPRINE HYDROCHLORIDE 5 MILLIGRAM(S): 10 TABLET, FILM COATED ORAL at 21:00

## 2021-03-19 RX ADMIN — HYDROMORPHONE HYDROCHLORIDE 0.5 MILLIGRAM(S): 2 INJECTION INTRAMUSCULAR; INTRAVENOUS; SUBCUTANEOUS at 18:55

## 2021-03-19 RX ADMIN — OXYCODONE HYDROCHLORIDE 10 MILLIGRAM(S): 5 TABLET ORAL at 20:20

## 2021-03-19 RX ADMIN — Medication 400 MILLIGRAM(S): at 23:54

## 2021-03-19 RX ADMIN — HYDROMORPHONE HYDROCHLORIDE 0.5 MILLIGRAM(S): 2 INJECTION INTRAMUSCULAR; INTRAVENOUS; SUBCUTANEOUS at 20:15

## 2021-03-19 RX ADMIN — ONDANSETRON 8 MILLIGRAM(S): 8 TABLET, FILM COATED ORAL at 17:51

## 2021-03-19 RX ADMIN — HEPARIN SODIUM 5000 UNIT(S): 5000 INJECTION INTRAVENOUS; SUBCUTANEOUS at 23:54

## 2021-03-19 RX ADMIN — HYDROMORPHONE HYDROCHLORIDE 0.5 MILLIGRAM(S): 2 INJECTION INTRAMUSCULAR; INTRAVENOUS; SUBCUTANEOUS at 18:25

## 2021-03-19 RX ADMIN — HYDROMORPHONE HYDROCHLORIDE 0.5 MILLIGRAM(S): 2 INJECTION INTRAMUSCULAR; INTRAVENOUS; SUBCUTANEOUS at 19:50

## 2021-03-19 RX ADMIN — SODIUM CHLORIDE 125 MILLILITER(S): 9 INJECTION, SOLUTION INTRAVENOUS at 18:16

## 2021-03-19 RX ADMIN — HYDROMORPHONE HYDROCHLORIDE 0.5 MILLIGRAM(S): 2 INJECTION INTRAMUSCULAR; INTRAVENOUS; SUBCUTANEOUS at 19:45

## 2021-03-19 RX ADMIN — HYDROMORPHONE HYDROCHLORIDE 0.5 MILLIGRAM(S): 2 INJECTION INTRAMUSCULAR; INTRAVENOUS; SUBCUTANEOUS at 19:10

## 2021-03-19 RX ADMIN — HYDROMORPHONE HYDROCHLORIDE 0.5 MILLIGRAM(S): 2 INJECTION INTRAMUSCULAR; INTRAVENOUS; SUBCUTANEOUS at 19:20

## 2021-03-19 RX ADMIN — GABAPENTIN 300 MILLIGRAM(S): 400 CAPSULE ORAL at 21:03

## 2021-03-19 RX ADMIN — HYDROMORPHONE HYDROCHLORIDE 0.5 MILLIGRAM(S): 2 INJECTION INTRAMUSCULAR; INTRAVENOUS; SUBCUTANEOUS at 18:16

## 2021-03-19 NOTE — BRIEF OPERATIVE NOTE - NSICDXBRIEFPROCEDURE_GEN_ALL_CORE_FT
PROCEDURES:  Bilateral salpingectomy 19-Mar-2021 17:26:06  Edna Solano  Supracervical hysterectomy 19-Mar-2021 17:25:54  Edna Solano

## 2021-03-19 NOTE — BRIEF OPERATIVE NOTE - OPERATION/FINDINGS
~5-8 week size uterus. Normal appearing uterus, fallopian tubes, and ovaries. Damari placed over surgical site

## 2021-03-19 NOTE — CHART NOTE - NSCHARTNOTEFT_GEN_A_CORE
Pt reports 10/10 pain despite 0.5mg Dilaudid x4, Oxy 10, Flexeril, Gabapentin, and Tylenol.    VS  T(C): 36.7 (03-19-21 @ 21:30)  HR: 73 (03-19-21 @ 21:30)  BP: 146/75 (03-19-21 @ 21:30)  RR: 20 (03-19-21 @ 21:30)  SpO2: 97% (03-19-21 @ 21:30)    PE:  Gen: Crying, NAD  Resp: unlabored on RA  GI: soft, appropriately tender, nondistended  : james in place draining yellow urine  Ext: no edema or pain b/l    Plan  - Pain consult placed for PCA      JADE Coleman, PGY-2  d/w Dr. Jaquez

## 2021-03-19 NOTE — CHART NOTE - NSCHARTNOTEFT_GEN_A_CORE
Patient seen and examined at bedside, recently post-op. Patient states she is in pain but the nurse is helping her. States her throat is scratchy and she spit up some blood. Denies CP, SOB, fevers, and chills.    Vital Signs Last 24 Hours  T(C): 36.7 (03-19-21 @ 18:00), Max: 36.7 (03-19-21 @ 17:15)  HR: 57 (03-19-21 @ 19:30) (57 - 91)  BP: 118/66 (03-19-21 @ 19:30) (105/71 - 157/80)  RR: 13 (03-19-21 @ 19:30) (12 - 22)  SpO2: 98% (03-19-21 @ 19:30) (96% - 100%)    I&O's Summary    19 Mar 2021 07:01  -  19 Mar 2021 20:20  --------------------------------------------------------  IN: 250 mL / OUT: 100 mL / NET: 150 mL        Physical Exam:  General: NAD  CV: NR, RR, S1, S2, no M/R/G  Lungs: CTA-B  Abdomen: Soft, appropriately tender, non-distended, +BS  Incision: Pfannensteil CDI  : james draining yellow urine  Ext: No pain or swelling    Labs:      MEDICATIONS  (STANDING):  acetaminophen   Tablet .. 975 milliGRAM(s) Oral every 6 hours  acetaminophen  IVPB .. 1000 milliGRAM(s) IV Intermittent every 6 hours  cyclobenzaprine 5 milliGRAM(s) Oral two times a day  famotidine    Tablet 20 milliGRAM(s) Oral daily  gabapentin 300 milliGRAM(s) Oral three times a day  lactated ringers. 1000 milliLiter(s) (125 mL/Hr) IV Continuous <Continuous>  lactated ringers. 1000 milliLiter(s) (30 mL/Hr) IV Continuous <Continuous>    MEDICATIONS  (PRN):  fentaNYL    Injectable 25 MICROGram(s) IV Push every 5 minutes PRN Moderate Pain (4 - 6)  ondansetron Injectable 8 milliGRAM(s) IV Push every 8 hours PRN Nausea and/or Vomiting  oxyCODONE    IR 5 milliGRAM(s) Oral every 4 hours PRN Moderate Pain (4 - 6)  oxyCODONE    IR 10 milliGRAM(s) Oral every 4 hours PRN Severe Pain (7 - 10)  simethicone 80 milliGRAM(s) Chew every 6 hours PRN Gas      45yo s/p Ex-Lap, Supracervical Hysterectomy, Bilateral Salpingectomy  recovering well in acute post-operative state.    Neuro: pain control with IV Tylenol, Oxy, Gabapentin, Flexeril  CV: Hemodynamically stable  Pulm: Encourage oob/ambulation, incentive spirometer at bedside  GI: Regular Diet, advance as tolerated  - pepcid, zofran, simethicone  : James in place; will keep in place overnight 2/2 pain control  Heme: HSQ and SCDs for DVT PPX  ID: afebrile  Endo: no active issues  Dispo: continue routine post-op care    JADE Coleman, PGY-2

## 2021-03-20 ENCOUNTER — TRANSCRIPTION ENCOUNTER (OUTPATIENT)
Age: 46
End: 2021-03-20

## 2021-03-20 DIAGNOSIS — Z98.890 OTHER SPECIFIED POSTPROCEDURAL STATES: ICD-10-CM

## 2021-03-20 LAB
ALBUMIN SERPL ELPH-MCNC: 3.6 G/DL — SIGNIFICANT CHANGE UP (ref 3.3–5)
ALP SERPL-CCNC: 39 U/L — LOW (ref 40–120)
ALT FLD-CCNC: 8 U/L — SIGNIFICANT CHANGE UP (ref 4–33)
ANION GAP SERPL CALC-SCNC: 8 MMOL/L — SIGNIFICANT CHANGE UP (ref 7–14)
AST SERPL-CCNC: 15 U/L — SIGNIFICANT CHANGE UP (ref 4–32)
BILIRUB SERPL-MCNC: 0.4 MG/DL — SIGNIFICANT CHANGE UP (ref 0.2–1.2)
BUN SERPL-MCNC: 8 MG/DL — SIGNIFICANT CHANGE UP (ref 7–23)
CALCIUM SERPL-MCNC: 9.2 MG/DL — SIGNIFICANT CHANGE UP (ref 8.4–10.5)
CHLORIDE SERPL-SCNC: 102 MMOL/L — SIGNIFICANT CHANGE UP (ref 98–107)
CO2 SERPL-SCNC: 26 MMOL/L — SIGNIFICANT CHANGE UP (ref 22–31)
CREAT SERPL-MCNC: 0.64 MG/DL — SIGNIFICANT CHANGE UP (ref 0.5–1.3)
GLUCOSE SERPL-MCNC: 103 MG/DL — HIGH (ref 70–99)
HCT VFR BLD CALC: 36.7 % — SIGNIFICANT CHANGE UP (ref 34.5–45)
HGB BLD-MCNC: 11.9 G/DL — SIGNIFICANT CHANGE UP (ref 11.5–15.5)
MCHC RBC-ENTMCNC: 29 PG — SIGNIFICANT CHANGE UP (ref 27–34)
MCHC RBC-ENTMCNC: 32.4 GM/DL — SIGNIFICANT CHANGE UP (ref 32–36)
MCV RBC AUTO: 89.5 FL — SIGNIFICANT CHANGE UP (ref 80–100)
NRBC # BLD: 0 /100 WBCS — SIGNIFICANT CHANGE UP
NRBC # FLD: 0 K/UL — SIGNIFICANT CHANGE UP
PLATELET # BLD AUTO: 159 K/UL — SIGNIFICANT CHANGE UP (ref 150–400)
POTASSIUM SERPL-MCNC: 4.1 MMOL/L — SIGNIFICANT CHANGE UP (ref 3.5–5.3)
POTASSIUM SERPL-SCNC: 4.1 MMOL/L — SIGNIFICANT CHANGE UP (ref 3.5–5.3)
PROT SERPL-MCNC: 6.3 G/DL — SIGNIFICANT CHANGE UP (ref 6–8.3)
RBC # BLD: 4.1 M/UL — SIGNIFICANT CHANGE UP (ref 3.8–5.2)
RBC # FLD: 12.7 % — SIGNIFICANT CHANGE UP (ref 10.3–14.5)
SODIUM SERPL-SCNC: 136 MMOL/L — SIGNIFICANT CHANGE UP (ref 135–145)
WBC # BLD: 8.78 K/UL — SIGNIFICANT CHANGE UP (ref 3.8–10.5)
WBC # FLD AUTO: 8.78 K/UL — SIGNIFICANT CHANGE UP (ref 3.8–10.5)

## 2021-03-20 RX ORDER — ACETAMINOPHEN 500 MG
975 TABLET ORAL EVERY 6 HOURS
Refills: 0 | Status: DISCONTINUED | OUTPATIENT
Start: 2021-03-20 | End: 2021-03-23

## 2021-03-20 RX ORDER — HYDROMORPHONE HYDROCHLORIDE 2 MG/ML
30 INJECTION INTRAMUSCULAR; INTRAVENOUS; SUBCUTANEOUS
Refills: 0 | Status: DISCONTINUED | OUTPATIENT
Start: 2021-03-20 | End: 2021-03-21

## 2021-03-20 RX ORDER — CYCLOBENZAPRINE HYDROCHLORIDE 10 MG/1
10 TABLET, FILM COATED ORAL EVERY 8 HOURS
Refills: 0 | Status: DISCONTINUED | OUTPATIENT
Start: 2021-03-20 | End: 2021-03-23

## 2021-03-20 RX ADMIN — HEPARIN SODIUM 5000 UNIT(S): 5000 INJECTION INTRAVENOUS; SUBCUTANEOUS at 17:50

## 2021-03-20 RX ADMIN — Medication 1000 MILLIGRAM(S): at 06:00

## 2021-03-20 RX ADMIN — Medication 975 MILLIGRAM(S): at 20:52

## 2021-03-20 RX ADMIN — SODIUM CHLORIDE 125 MILLILITER(S): 9 INJECTION, SOLUTION INTRAVENOUS at 07:56

## 2021-03-20 RX ADMIN — HYDROMORPHONE HYDROCHLORIDE 30 MILLILITER(S): 2 INJECTION INTRAMUSCULAR; INTRAVENOUS; SUBCUTANEOUS at 19:25

## 2021-03-20 RX ADMIN — Medication 975 MILLIGRAM(S): at 19:33

## 2021-03-20 RX ADMIN — ONDANSETRON 8 MILLIGRAM(S): 8 TABLET, FILM COATED ORAL at 07:57

## 2021-03-20 RX ADMIN — HYDROMORPHONE HYDROCHLORIDE 30 MILLILITER(S): 2 INJECTION INTRAMUSCULAR; INTRAVENOUS; SUBCUTANEOUS at 00:03

## 2021-03-20 RX ADMIN — FAMOTIDINE 20 MILLIGRAM(S): 10 INJECTION INTRAVENOUS at 11:16

## 2021-03-20 RX ADMIN — ONDANSETRON 8 MILLIGRAM(S): 8 TABLET, FILM COATED ORAL at 18:40

## 2021-03-20 RX ADMIN — GABAPENTIN 300 MILLIGRAM(S): 400 CAPSULE ORAL at 05:49

## 2021-03-20 RX ADMIN — HYDROMORPHONE HYDROCHLORIDE 30 MILLILITER(S): 2 INJECTION INTRAMUSCULAR; INTRAVENOUS; SUBCUTANEOUS at 07:21

## 2021-03-20 RX ADMIN — CYCLOBENZAPRINE HYDROCHLORIDE 10 MILLIGRAM(S): 10 TABLET, FILM COATED ORAL at 18:49

## 2021-03-20 RX ADMIN — SODIUM CHLORIDE 125 MILLILITER(S): 9 INJECTION, SOLUTION INTRAVENOUS at 01:43

## 2021-03-20 RX ADMIN — SODIUM CHLORIDE 125 MILLILITER(S): 9 INJECTION, SOLUTION INTRAVENOUS at 21:52

## 2021-03-20 RX ADMIN — GABAPENTIN 300 MILLIGRAM(S): 400 CAPSULE ORAL at 13:41

## 2021-03-20 RX ADMIN — Medication 400 MILLIGRAM(S): at 11:14

## 2021-03-20 RX ADMIN — HYDROMORPHONE HYDROCHLORIDE 30 MILLILITER(S): 2 INJECTION INTRAMUSCULAR; INTRAVENOUS; SUBCUTANEOUS at 10:55

## 2021-03-20 RX ADMIN — Medication 1000 MILLIGRAM(S): at 11:44

## 2021-03-20 RX ADMIN — GABAPENTIN 300 MILLIGRAM(S): 400 CAPSULE ORAL at 21:52

## 2021-03-20 RX ADMIN — SIMETHICONE 80 MILLIGRAM(S): 80 TABLET, CHEWABLE ORAL at 07:57

## 2021-03-20 RX ADMIN — Medication 400 MILLIGRAM(S): at 05:49

## 2021-03-20 RX ADMIN — CYCLOBENZAPRINE HYDROCHLORIDE 10 MILLIGRAM(S): 10 TABLET, FILM COATED ORAL at 11:16

## 2021-03-20 RX ADMIN — Medication 1000 MILLIGRAM(S): at 00:00

## 2021-03-20 RX ADMIN — HEPARIN SODIUM 5000 UNIT(S): 5000 INJECTION INTRAVENOUS; SUBCUTANEOUS at 07:57

## 2021-03-20 RX ADMIN — ONDANSETRON 4 MILLIGRAM(S): 8 TABLET, FILM COATED ORAL at 01:42

## 2021-03-20 NOTE — PROVIDER CONTACT NOTE (OTHER) - ASSESSMENT
Patient given flexeril with systolic below 100. Patient BP was 98/55. Patient alert and currently eating in chair.

## 2021-03-20 NOTE — PROGRESS NOTE ADULT - SUBJECTIVE AND OBJECTIVE BOX
SHORTY Progress Note    POD# 1  HD#2    Patient seen and examined at bedside.  No acute events overnight. No acute complaints.  Pain well controlled.  Patient is ambulating and tolerating regular diet.  Has not yet passed flatus.  Florez is still in place.   Denies CP, SOB, N/V, fevers, and chills.    Vital Signs Last 24 Hours  T(C): 37.6 (03-20-21 @ 05:48), Max: 37.6 (03-20-21 @ 05:48)  HR: 78 (03-20-21 @ 05:48) (57 - 91)  BP: 120/74 (03-20-21 @ 05:48) (105/71 - 157/80)  RR: 18 (03-20-21 @ 05:48) (12 - 22)  SpO2: 100% (03-20-21 @ 05:48) (96% - 100%)    I&O's Summary    19 Mar 2021 07:01  -  20 Mar 2021 05:52  --------------------------------------------------------  IN: 1640 mL / OUT: 450 mL / NET: 1190 mL    Physical Exam:  General: NAD  CV: RR, S1, S2  Lungs: CTA b/l, good air flow b/l   Abdomen: Soft, mildly-tender to palpation diffusely, softly distended, normoactive bowel sounds  Incision: pfannenstiel incision sites, dressing, CDI  : no bleeding on pad  Ext: warm and well perfused    Labs:      MEDICATIONS  (STANDING):  acetaminophen   Tablet .. 975 milliGRAM(s) Oral every 6 hours  acetaminophen  IVPB .. 1000 milliGRAM(s) IV Intermittent every 6 hours  cyclobenzaprine 5 milliGRAM(s) Oral two times a day  famotidine    Tablet 20 milliGRAM(s) Oral daily  gabapentin 300 milliGRAM(s) Oral three times a day  heparin   Injectable 5000 Unit(s) SubCutaneous every 8 hours  HYDROmorphone PCA (1 mG/mL) 30 milliLiter(s) PCA Continuous PCA Continuous  lactated ringers. 1000 milliLiter(s) (125 mL/Hr) IV Continuous <Continuous>  lactated ringers. 1000 milliLiter(s) (30 mL/Hr) IV Continuous <Continuous>    MEDICATIONS  (PRN):  HYDROmorphone PCA (1 mG/mL) Rescue Clinician Bolus 0.5 milliGRAM(s) IV Push every 15 minutes PRN for Pain Scale GREATER THAN 6  naloxone Injectable 0.1 milliGRAM(s) IV Push every 3 minutes PRN For ANY of the following changes in patient status:  A. RR LESS THAN 10 breaths per minute, B. Oxygen saturation LESS THAN 90%, C. Sedation score of 6  ondansetron Injectable 8 milliGRAM(s) IV Push every 8 hours PRN Nausea and/or Vomiting  oxyCODONE    IR 5 milliGRAM(s) Oral every 4 hours PRN Moderate Pain (4 - 6)  oxyCODONE    IR 10 milliGRAM(s) Oral every 4 hours PRN Severe Pain (7 - 10)  simethicone 80 milliGRAM(s) Chew every 6 hours PRN Gas SHORTY Progress Note    POD# 1  HD#2    Patient seen and examined at bedside.  No acute events overnight.  Patient has chronic pain and postop pain has been poorly controlled.  Patient w/ emesis yesterday evening and nausea currently.  Patient is ambulating and tolerating regular diet.  Has not yet passed flatus.  Florez is still in place.   Denies CP, SOB, fevers, and chills.    Vital Signs Last 24 Hours  T(C): 37.6 (03-20-21 @ 05:48), Max: 37.6 (03-20-21 @ 05:48)  HR: 78 (03-20-21 @ 05:48) (57 - 91)  BP: 120/74 (03-20-21 @ 05:48) (105/71 - 157/80)  RR: 18 (03-20-21 @ 05:48) (12 - 22)  SpO2: 100% (03-20-21 @ 05:48) (96% - 100%)    I&O's Summary    19 Mar 2021 07:01  -  20 Mar 2021 05:52  --------------------------------------------------------  IN: 1640 mL / OUT: 450 mL / NET: 1190 mL    Physical Exam:  General: NAD  CV: RR, S1, S2  Lungs: CTA b/l, good air flow b/l   Abdomen: Soft, mildly-tender to palpation diffusely, softly distended, normoactive bowel sounds  Incision: pfannenstiel incision sites, dermabond dressing, CDI  : s[potting on pad  Ext: warm and well perfused    Labs:      MEDICATIONS  (STANDING):  acetaminophen   Tablet .. 975 milliGRAM(s) Oral every 6 hours  acetaminophen  IVPB .. 1000 milliGRAM(s) IV Intermittent every 6 hours  cyclobenzaprine 5 milliGRAM(s) Oral two times a day  famotidine    Tablet 20 milliGRAM(s) Oral daily  gabapentin 300 milliGRAM(s) Oral three times a day  heparin   Injectable 5000 Unit(s) SubCutaneous every 8 hours  HYDROmorphone PCA (1 mG/mL) 30 milliLiter(s) PCA Continuous PCA Continuous  lactated ringers. 1000 milliLiter(s) (125 mL/Hr) IV Continuous <Continuous>  lactated ringers. 1000 milliLiter(s) (30 mL/Hr) IV Continuous <Continuous>    MEDICATIONS  (PRN):  HYDROmorphone PCA (1 mG/mL) Rescue Clinician Bolus 0.5 milliGRAM(s) IV Push every 15 minutes PRN for Pain Scale GREATER THAN 6  naloxone Injectable 0.1 milliGRAM(s) IV Push every 3 minutes PRN For ANY of the following changes in patient status:  A. RR LESS THAN 10 breaths per minute, B. Oxygen saturation LESS THAN 90%, C. Sedation score of 6  ondansetron Injectable 8 milliGRAM(s) IV Push every 8 hours PRN Nausea and/or Vomiting  oxyCODONE    IR 5 milliGRAM(s) Oral every 4 hours PRN Moderate Pain (4 - 6)  oxyCODONE    IR 10 milliGRAM(s) Oral every 4 hours PRN Severe Pain (7 - 10)  simethicone 80 milliGRAM(s) Chew every 6 hours PRN Gas SHORTY Progress Note    POD# 1  HD#2    Patient seen and examined at bedside.  No acute events overnight.  Patient has chronic pain and postop pain has been poorly controlled.  Patient w/ emesis yesterday evening and nausea currently.  Patient is ambulating and tolerating regular diet.  No flatus.  Florez is still in place.   Denies CP, SOB, fevers, and chills.    Vital Signs Last 24 Hours  T(C): 37.6 (03-20-21 @ 05:48), Max: 37.6 (03-20-21 @ 05:48)  HR: 78 (03-20-21 @ 05:48) (57 - 91)  BP: 120/74 (03-20-21 @ 05:48) (105/71 - 157/80)  RR: 18 (03-20-21 @ 05:48) (12 - 22)  SpO2: 100% (03-20-21 @ 05:48) (96% - 100%)    I&O's Summary    19 Mar 2021 07:01  -  20 Mar 2021 05:52  --------------------------------------------------------  IN: 1640 mL / OUT: 450 mL / NET: 1190 mL    Physical Exam:  General: NAD  CV: RR, S1, S2  Lungs: CTA b/l, good air flow b/l   Abdomen: Soft, mildly-tender to palpation diffusely, softly distended, normoactive bowel sounds  Incision: pfannenstiel incision sites, dermabond dressing, CDI  : s[potting on pad  Ext: warm and well perfused    Labs:      MEDICATIONS  (STANDING):  acetaminophen   Tablet .. 975 milliGRAM(s) Oral every 6 hours  acetaminophen  IVPB .. 1000 milliGRAM(s) IV Intermittent every 6 hours  cyclobenzaprine 5 milliGRAM(s) Oral two times a day  famotidine    Tablet 20 milliGRAM(s) Oral daily  gabapentin 300 milliGRAM(s) Oral three times a day  heparin   Injectable 5000 Unit(s) SubCutaneous every 8 hours  HYDROmorphone PCA (1 mG/mL) 30 milliLiter(s) PCA Continuous PCA Continuous  lactated ringers. 1000 milliLiter(s) (125 mL/Hr) IV Continuous <Continuous>  lactated ringers. 1000 milliLiter(s) (30 mL/Hr) IV Continuous <Continuous>    MEDICATIONS  (PRN):  HYDROmorphone PCA (1 mG/mL) Rescue Clinician Bolus 0.5 milliGRAM(s) IV Push every 15 minutes PRN for Pain Scale GREATER THAN 6  naloxone Injectable 0.1 milliGRAM(s) IV Push every 3 minutes PRN For ANY of the following changes in patient status:  A. RR LESS THAN 10 breaths per minute, B. Oxygen saturation LESS THAN 90%, C. Sedation score of 6  ondansetron Injectable 8 milliGRAM(s) IV Push every 8 hours PRN Nausea and/or Vomiting  oxyCODONE    IR 5 milliGRAM(s) Oral every 4 hours PRN Moderate Pain (4 - 6)  oxyCODONE    IR 10 milliGRAM(s) Oral every 4 hours PRN Severe Pain (7 - 10)  simethicone 80 milliGRAM(s) Chew every 6 hours PRN Gas

## 2021-03-20 NOTE — DISCHARGE NOTE PROVIDER - CARE PROVIDER_API CALL
Kohanim, Behnam  [facility]ICS [facility] GYNEC[facility]  [address], [address]  [place] [address]  Phone: [phone]  Fax: [phone]  Follow Up Time:

## 2021-03-20 NOTE — PATIENT PROFILE ADULT - HAS THE PATIENT USED TOBACCO IN THE PAST 30 DAYS?
Pharmacy requesting K+ and Pantoprazole    Last OV 01/09/2020  Last RF 10/28/19  Last BMP 10/16/19  Last CMP 10/05/19    
No

## 2021-03-20 NOTE — PROGRESS NOTE ADULT - SUBJECTIVE AND OBJECTIVE BOX
Anesthesia Pain Management Service    SUBJECTIVE: Patient is doing well with IV PCA and no significant problems reported.  Patient states at home she takes Flexeril 10 mg TID, and Percocet 10-325mg 4x a day, GIORGI 300 mg TID, two headache medications something similar to Fioricet 1 tab 1-3 times a day and Nurtec 75mg once a day. At home, Dr. Sal[facility] manages and prescribes her pain medications.    Pain Scale Score	At rest: _5/10__ 	With Activity: ___ 	[X ] Refer to charted pain scores    THERAPY:    [ ] IV PCA Morphine		[ ] 5 mg/mL	[ ] 1 mg/mL  [X ] IV PCA Hydromorphone	[ ] 5 mg/mL	[X ] 1 mg/mL  [ ] IV PCA Fentanyl		[ ] 50 micrograms/mL    Demand dose __0.2_ lockout __6_ (minutes) Continuous Rate _0__ Total: _3.9__  mg used (in past 24 hours)      MEDICATIONS  (STANDING):  acetaminophen   Tablet .. 975 milliGRAM(s) Oral every 6 hours  acetaminophen  IVPB .. 1000 milliGRAM(s) IV Intermittent every 6 hours  cyclobenzaprine 5 milliGRAM(s) Oral two times a day  famotidine    Tablet 20 milliGRAM(s) Oral daily  gabapentin 300 milliGRAM(s) Oral three times a day  heparin   Injectable 5000 Unit(s) SubCutaneous every 8 hours  HYDROmorphone PCA (1 mG/mL) 30 milliLiter(s) PCA Continuous PCA Continuous  lactated ringers. 1000 milliLiter(s) (125 mL/Hr) IV Continuous <Continuous>  lactated ringers. 1000 milliLiter(s) (30 mL/Hr) IV Continuous <Continuous>    MEDICATIONS  (PRN):  HYDROmorphone PCA (1 mG/mL) Rescue Clinician Bolus 0.5 milliGRAM(s) IV Push every 15 minutes PRN for Pain Scale GREATER THAN 6  naloxone Injectable 0.1 milliGRAM(s) IV Push every 3 minutes PRN For ANY of the following changes in patient status:  A. RR LESS THAN 10 breaths per minute, B. Oxygen saturation LESS THAN 90%, C. Sedation score of 6  ondansetron Injectable 8 milliGRAM(s) IV Push every 8 hours PRN Nausea and/or Vomiting  simethicone 80 milliGRAM(s) Chew every 6 hours PRN Gas      OBJECTIVE:  Patient is sitting up in chair.    Sedation Score:	[ X] Alert	 [ ] Drowsy 	[ ] Arousable	[ ] Asleep	[ ] Unresponsive    Side Effects:	[X ] None	[ ] Nausea	[ ] Vomiting	[ ] Pruritus  		[ ] Other:    Vital Signs Last 24 Hrs  T(C): 36.8 (20 Mar 2021 10:02), Max: 37.6 (20 Mar 2021 05:48)  T(F): 98.3 (20 Mar 2021 10:02), Max: 99.7 (20 Mar 2021 05:48)  HR: 60 (20 Mar 2021 10:02) (57 - 91)  BP: 99/63 (20 Mar 2021 10:02) (99/63 - 157/80)  BP(mean): 91 (19 Mar 2021 21:00) (78 - 95)  RR: 18 (20 Mar 2021 10:02) (12 - 22)  SpO2: 97% (20 Mar 2021 10:02) (96% - 100%)    ASSESSMENT/ PLAN    Therapy to  be:	[ X] Continue   [ ] Discontinued   [ ] Change to prn Analgesics    Documentation and Verification of current medications:   [X] Done	[ ] Not done, not elligible    Comments: Will continue with IV Dilaudid PCA. Will discuss with team increasing patient's Flexeril dose to 10 mg TID standing, and will increase PCA demand dose to 0.25mg.   Recommend non-opioid adjuvant analgesics to be used when possible and when allowed by primary surgical team.    Progress Note written now but Patient was seen earlier. Anesthesia Pain Management Service    SUBJECTIVE: Patient is doing well with IV PCA and no significant problems reported.  Patient states at home she takes Flexeril 10 mg TID, and Percocet 10-325mg 4x a day, GIORGI 300 mg TID, two headache medications something similar to Fioricet 1 tab 1-3 times a day and Nurtec 75mg once a day. At home, Dr. Salas from [facility] manages and prescribes her pain medications.    Pain Scale Score	At rest: _8/10__ 	With Activity: ___ 	[X ] Refer to charted pain scores    THERAPY:    [ ] IV PCA Morphine		[ ] 5 mg/mL	[ ] 1 mg/mL  [X ] IV PCA Hydromorphone	[ ] 5 mg/mL	[X ] 1 mg/mL  [ ] IV PCA Fentanyl		[ ] 50 micrograms/mL    Demand dose __0.2_ lockout __6_ (minutes) Continuous Rate _0__ Total: _3.9__  mg used (in past 24 hours)      MEDICATIONS  (STANDING):  acetaminophen   Tablet .. 975 milliGRAM(s) Oral every 6 hours  acetaminophen  IVPB .. 1000 milliGRAM(s) IV Intermittent every 6 hours  cyclobenzaprine 5 milliGRAM(s) Oral two times a day  famotidine    Tablet 20 milliGRAM(s) Oral daily  gabapentin 300 milliGRAM(s) Oral three times a day  heparin   Injectable 5000 Unit(s) SubCutaneous every 8 hours  HYDROmorphone PCA (1 mG/mL) 30 milliLiter(s) PCA Continuous PCA Continuous  lactated ringers. 1000 milliLiter(s) (125 mL/Hr) IV Continuous <Continuous>  lactated ringers. 1000 milliLiter(s) (30 mL/Hr) IV Continuous <Continuous>    MEDICATIONS  (PRN):  HYDROmorphone PCA (1 mG/mL) Rescue Clinician Bolus 0.5 milliGRAM(s) IV Push every 15 minutes PRN for Pain Scale GREATER THAN 6  naloxone Injectable 0.1 milliGRAM(s) IV Push every 3 minutes PRN For ANY of the following changes in patient status:  A. RR LESS THAN 10 breaths per minute, B. Oxygen saturation LESS THAN 90%, C. Sedation score of 6  ondansetron Injectable 8 milliGRAM(s) IV Push every 8 hours PRN Nausea and/or Vomiting  simethicone 80 milliGRAM(s) Chew every 6 hours PRN Gas      OBJECTIVE:  Patient is sitting up in chair.    Sedation Score:	[ X] Alert	 [ ] Drowsy 	[ ] Arousable	[ ] Asleep	[ ] Unresponsive    Side Effects:	[X ] None	[ ] Nausea	[ ] Vomiting	[ ] Pruritus  		[ ] Other:    Vital Signs Last 24 Hrs  T(C): 36.8 (20 Mar 2021 10:02), Max: 37.6 (20 Mar 2021 05:48)  T(F): 98.3 (20 Mar 2021 10:02), Max: 99.7 (20 Mar 2021 05:48)  HR: 60 (20 Mar 2021 10:02) (57 - 91)  BP: 99/63 (20 Mar 2021 10:02) (99/63 - 157/80)  BP(mean): 91 (19 Mar 2021 21:00) (78 - 95)  RR: 18 (20 Mar 2021 10:02) (12 - 22)  SpO2: 97% (20 Mar 2021 10:02) (96% - 100%)    ASSESSMENT/ PLAN    Therapy to  be:	[ X] Continue   [ ] Discontinued   [ ] Change to prn Analgesics    Documentation and Verification of current medications:   [X] Done	[ ] Not done, not elligible    Comments: Will continue with IV Dilaudid PCA. Will discuss with team increasing patient's Flexeril dose to 10 mg TID standing, and will increase PCA demand dose to 0.25mg.   Recommend non-opioid adjuvant analgesics to be used when possible and when allowed by primary surgical team.    Progress Note written now but Patient was seen earlier.

## 2021-03-20 NOTE — PROGRESS NOTE ADULT - PROBLEM SELECTOR PLAN 1
Neuro: Pain well controlled w/ PCA, transition to PO meidcations  CV: hemodynamically stable, monitor vitals  Pulm: saturating well on room air, encourage oob/amb, continuous pulse ox while on PCA  GI: tolerating regular diet, LR@125.  SLIV when spontaneously voids  : indwelling james catheter, remove pending AM labs  Heme: SCDs, HSQ for DVT ppx, f/u AM H&H, early ambulation  ID: afebrile  Dispo: continue routine post-op care    Mimi Ivory PGY2 Neuro: Pain control persistently an issue even w/ PCA  CV: hemodynamically stable, monitor vitals  Pulm: saturating well on room air, encourage oob/amb, continuous pulse ox while on PCA  GI: on regular diet but self restricting to water due to nausea, LR@125.  SLIV when spontaneously voids  : indwelling james catheter, remove pending AM labs  Heme: SCDs, HSQ for DVT ppx, f/u AM H&H, early ambulation  ID: afebrile  Dispo: continue routine post-op care    Mimi Ivory PGY2

## 2021-03-20 NOTE — PROGRESS NOTE ADULT - SUBJECTIVE AND OBJECTIVE BOX
Anesthesia Pain Management Service    SUBJECTIVE: Patient is doing well with IV PCA and no significant problems reported.  Patient states at home she takes Flexeril 10 mg TID, and Percocet 10-325mg 4x a day, GIORIG 300 mg TID, two headache medications something similar to Fioricet 1 tab 1-3 times a day and Nurtec 75mg once a day. At home, Dr. Salas from [facility] manages and prescribes her pain medications.    Pain Scale Score	At rest: _8/10__ 	With Activity: ___ 	[X ] Refer to charted pain scores    THERAPY:    [ ] IV PCA Morphine		[ ] 5 mg/mL	[ ] 1 mg/mL  [X ] IV PCA Hydromorphone	[ ] 5 mg/mL	[X ] 1 mg/mL  [ ] IV PCA Fentanyl		[ ] 50 micrograms/mL    Demand dose __0.2_ lockout __6_ (minutes) Continuous Rate _0__ Total: _3.9__  mg used (in past 24 hours)      MEDICATIONS  (STANDING):  acetaminophen   Tablet .. 975 milliGRAM(s) Oral every 6 hours  acetaminophen  IVPB .. 1000 milliGRAM(s) IV Intermittent every 6 hours  cyclobenzaprine 5 milliGRAM(s) Oral two times a day  famotidine    Tablet 20 milliGRAM(s) Oral daily  gabapentin 300 milliGRAM(s) Oral three times a day  heparin   Injectable 5000 Unit(s) SubCutaneous every 8 hours  HYDROmorphone PCA (1 mG/mL) 30 milliLiter(s) PCA Continuous PCA Continuous  lactated ringers. 1000 milliLiter(s) (125 mL/Hr) IV Continuous <Continuous>  lactated ringers. 1000 milliLiter(s) (30 mL/Hr) IV Continuous <Continuous>    MEDICATIONS  (PRN):  HYDROmorphone PCA (1 mG/mL) Rescue Clinician Bolus 0.5 milliGRAM(s) IV Push every 15 minutes PRN for Pain Scale GREATER THAN 6  naloxone Injectable 0.1 milliGRAM(s) IV Push every 3 minutes PRN For ANY of the following changes in patient status:  A. RR LESS THAN 10 breaths per minute, B. Oxygen saturation LESS THAN 90%, C. Sedation score of 6  ondansetron Injectable 8 milliGRAM(s) IV Push every 8 hours PRN Nausea and/or Vomiting  simethicone 80 milliGRAM(s) Chew every 6 hours PRN Gas      OBJECTIVE:  Patient is sitting up in chair.    Sedation Score:	[ X] Alert	 [ ] Drowsy 	[ ] Arousable	[ ] Asleep	[ ] Unresponsive    Side Effects:	[X ] None	[ ] Nausea	[ ] Vomiting	[ ] Pruritus  		[ ] Other:    Vital Signs Last 24 Hrs  T(C): 36.8 (20 Mar 2021 10:02), Max: 37.6 (20 Mar 2021 05:48)  T(F): 98.3 (20 Mar 2021 10:02), Max: 99.7 (20 Mar 2021 05:48)  HR: 60 (20 Mar 2021 10:02) (57 - 91)  BP: 99/63 (20 Mar 2021 10:02) (99/63 - 157/80)  BP(mean): 91 (19 Mar 2021 21:00) (78 - 95)  RR: 18 (20 Mar 2021 10:02) (12 - 22)  SpO2: 97% (20 Mar 2021 10:02) (96% - 100%)    ASSESSMENT/ PLAN    Therapy to  be:	[ X] Continue   [ ] Discontinued   [ ] Change to prn Analgesics    Documentation and Verification of current medications:   [X] Done	[ ] Not done, not elligible    Comments: Will continue with IV Dilaudid PCA. Will discuss with team increasing patient's Flexeril dose to 10 mg TID standing, and will increase PCA demand dose to 0.25mg.   Recommend non-opioid adjuvant analgesics to be used when possible and when allowed by primary surgical team.    Progress Note written now but Patient was seen earlier. Anesthesia Pain Management Service    SUBJECTIVE: Patient is doing well with IV PCA and no significant problems reported.  Patient states at home she takes Flexeril 10 mg TID, and Percocet 10-325mg 4x a day, GIORGI 300 mg TID, two headache medications something similar to Fioricet 1 tab 1-3 times a day and Nurtec 75mg once a day. At home, Dr. Salas from [facility] manages and prescribes her pain medications.    Pain Scale Score	At rest: _8/10__ 	With Activity: ___ 	[X ] Refer to charted pain scores    THERAPY:    [ ] IV PCA Morphine		[ ] 5 mg/mL	[ ] 1 mg/mL  [X ] IV PCA Hydromorphone	[ ] 5 mg/mL	[X ] 1 mg/mL  [ ] IV PCA Fentanyl		[ ] 50 micrograms/mL    Demand dose __0.2_ lockout __6_ (minutes) Continuous Rate _0__ Total: _3.9__  mg used (in past 24 hours)      MEDICATIONS  (STANDING):  acetaminophen   Tablet .. 975 milliGRAM(s) Oral every 6 hours  acetaminophen  IVPB .. 1000 milliGRAM(s) IV Intermittent every 6 hours  cyclobenzaprine 5 milliGRAM(s) Oral two times a day  famotidine    Tablet 20 milliGRAM(s) Oral daily  gabapentin 300 milliGRAM(s) Oral three times a day  heparin   Injectable 5000 Unit(s) SubCutaneous every 8 hours  HYDROmorphone PCA (1 mG/mL) 30 milliLiter(s) PCA Continuous PCA Continuous  lactated ringers. 1000 milliLiter(s) (125 mL/Hr) IV Continuous <Continuous>  lactated ringers. 1000 milliLiter(s) (30 mL/Hr) IV Continuous <Continuous>    MEDICATIONS  (PRN):  HYDROmorphone PCA (1 mG/mL) Rescue Clinician Bolus 0.5 milliGRAM(s) IV Push every 15 minutes PRN for Pain Scale GREATER THAN 6  naloxone Injectable 0.1 milliGRAM(s) IV Push every 3 minutes PRN For ANY of the following changes in patient status:  A. RR LESS THAN 10 breaths per minute, B. Oxygen saturation LESS THAN 90%, C. Sedation score of 6  ondansetron Injectable 8 milliGRAM(s) IV Push every 8 hours PRN Nausea and/or Vomiting  simethicone 80 milliGRAM(s) Chew every 6 hours PRN Gas      OBJECTIVE:  Patient is sitting up in chair.    Sedation Score:	[ X] Alert	 [ ] Drowsy 	[ ] Arousable	[ ] Asleep	[ ] Unresponsive    Side Effects:	[X ] None	[ ] Nausea	[ ] Vomiting	[ ] Pruritus  		[ ] Other:    Vital Signs Last 24 Hrs  T(C): 36.8 (20 Mar 2021 10:02), Max: 37.6 (20 Mar 2021 05:48)  T(F): 98.3 (20 Mar 2021 10:02), Max: 99.7 (20 Mar 2021 05:48)  HR: 60 (20 Mar 2021 10:02) (57 - 91)  BP: 99/63 (20 Mar 2021 10:02) (99/63 - 157/80)  BP(mean): 91 (19 Mar 2021 21:00) (78 - 95)  RR: 18 (20 Mar 2021 10:02) (12 - 22)  SpO2: 97% (20 Mar 2021 10:02) (96% - 100%)    ASSESSMENT/ PLAN    Therapy to  be:	[ X] Continue   [ ] Discontinued   [ ] Change to prn Analgesics    Documentation and Verification of current medications:   [X] Done	[ ] Not done, not elligible    Comments: Will continue with IV Dilaudid PCA. Will discuss with team increasing patient's Flexeril dose to 10 mg TID standing, and will increase PCA demand dose to 0.25mg.   Recommend non-opioid adjuvant analgesics to be used when possible and when allowed by primary surgical team.  Patient is on continuous pulse ox.    Progress Note written now but Patient was seen earlier.

## 2021-03-20 NOTE — DISCHARGE NOTE PROVIDER - NSDCMRMEDTOKEN_GEN_ALL_CORE_FT
Fioricet oral tablet: 1 tab(s) orally every 4 hours, As Needed  Flexeril 5 mg oral tablet: 1 tab(s) orally 2 times a day  gabapentin 300 mg oral tablet: 1 tab(s) orally 3 times a day  Nurtec ODT 75 mg oral tablet, disintegratin tab(s) orally once, As Needed  pantoprazole 40 mg oral delayed release tablet: 1 tab(s) orally once a day  Percocet 10/325 oral tablet: 1 tab(s) orally every 6 hours, As Needed   Fioricet oral tablet: 1 tab(s) orally every 4 hours, As Needed  Flexeril 5 mg oral tablet: 1 tab(s) orally 2 times a day  Nurtec ODT 75 mg oral tablet, disintegratin tab(s) orally once, As Needed  oxyCODONE 5 mg oral tablet: 1 tab(s) orally every 6 hours, As Needed -for severe pain MDD:4. Do not take at the same time with Percocet.   pantoprazole 40 mg oral delayed release tablet: 1 tab(s) orally once a day  Percocet 10/325 oral tablet: 1 tab(s) orally every 6 hours, As Needed   Fioricet oral tablet: 1 tab(s) orally every 4 hours, As Needed  Flexeril 5 mg oral tablet: 1 tab(s) orally 2 times a day  Gas Relief Extra Strength 125 mg oral capsule: 1 cap(s) orally 1 to 2 times a day, As Needed   Nurtec ODT 75 mg oral tablet, disintegratin tab(s) orally once, As Needed  oxyCODONE 5 mg oral tablet: 1 tab(s) orally every 6 hours, As Needed -for severe pain MDD:4. Do not take at the same time with Percocet.   pantoprazole 40 mg oral delayed release tablet: 1 tab(s) orally once a day  Percocet 10/325 oral tablet: 1 tab(s) orally every 6 hours, As Needed

## 2021-03-20 NOTE — DISCHARGE NOTE PROVIDER - NSDCCPCAREPLAN_GEN_ALL_CORE_FT
PRINCIPAL DISCHARGE DIAGNOSIS  Diagnosis: Postoperative state  Assessment and Plan of Treatment:        PRINCIPAL DISCHARGE DIAGNOSIS  Diagnosis: Postoperative state  Assessment and Plan of Treatment: Regular diet as tolerated, regular activity as tolerated, no heavy lifting for first six weeks.  Nothing per vagina: no intercourse, tampons or douching. Call your provider if you experience fevers, chills, worsening abdominal pain, inability to urinate or worsening vaginal bleeding. Please follow up with your doctor in 2 weeks for a postoperative check.

## 2021-03-20 NOTE — DISCHARGE NOTE PROVIDER - HOSPITAL COURSE
Patient had uncomplicated Supracervical Hysterectomy, BS.   Please see operative note for details.  During postoperative course patient's vitals were stable, vaginal bleeding appropriate, and pain well controlled. On day of discharge patient was ambulating, her pain controlled with oral medications, had adequate oral intake, and was voiding freely.  Discharge instructions and precautions were given.  Will have close follow up with Dr. Calle   Patient had uncomplicated supracervical hysterectomy, bilateral salpingectomy.  Please see operative note for details.  During postoperative course patient's vitals were stable, vaginal bleeding appropriate and pain well controlled. On day of discharge, patient is ambulating with well controlled pain on oral medications, tolerating regular diet and voiding spontaneously. Patient discharged home on POD#4 in stable condition with close follow up with Dr. Calle

## 2021-03-21 DIAGNOSIS — Z98.890 OTHER SPECIFIED POSTPROCEDURAL STATES: ICD-10-CM

## 2021-03-21 LAB
HCT VFR BLD CALC: 33.3 % — LOW (ref 34.5–45)
HGB BLD-MCNC: 10.4 G/DL — LOW (ref 11.5–15.5)
MCHC RBC-ENTMCNC: 29.1 PG — SIGNIFICANT CHANGE UP (ref 27–34)
MCHC RBC-ENTMCNC: 31.2 GM/DL — LOW (ref 32–36)
MCV RBC AUTO: 93 FL — SIGNIFICANT CHANGE UP (ref 80–100)
NRBC # BLD: 0 /100 WBCS — SIGNIFICANT CHANGE UP
NRBC # FLD: 0 K/UL — SIGNIFICANT CHANGE UP
PLATELET # BLD AUTO: 137 K/UL — LOW (ref 150–400)
RBC # BLD: 3.58 M/UL — LOW (ref 3.8–5.2)
RBC # FLD: 12.8 % — SIGNIFICANT CHANGE UP (ref 10.3–14.5)
WBC # BLD: 5.89 K/UL — SIGNIFICANT CHANGE UP (ref 3.8–10.5)
WBC # FLD AUTO: 5.89 K/UL — SIGNIFICANT CHANGE UP (ref 3.8–10.5)

## 2021-03-21 RX ORDER — OXYCODONE HYDROCHLORIDE 5 MG/1
10 TABLET ORAL EVERY 6 HOURS
Refills: 0 | Status: DISCONTINUED | OUTPATIENT
Start: 2021-03-21 | End: 2021-03-23

## 2021-03-21 RX ORDER — HEPARIN SODIUM 5000 [USP'U]/ML
5000 INJECTION INTRAVENOUS; SUBCUTANEOUS EVERY 12 HOURS
Refills: 0 | Status: DISCONTINUED | OUTPATIENT
Start: 2021-03-21 | End: 2021-03-23

## 2021-03-21 RX ORDER — OXYCODONE HYDROCHLORIDE 5 MG/1
5 TABLET ORAL EVERY 6 HOURS
Refills: 0 | Status: DISCONTINUED | OUTPATIENT
Start: 2021-03-21 | End: 2021-03-23

## 2021-03-21 RX ORDER — SODIUM CHLORIDE 9 MG/ML
3 INJECTION INTRAMUSCULAR; INTRAVENOUS; SUBCUTANEOUS EVERY 8 HOURS
Refills: 0 | Status: DISCONTINUED | OUTPATIENT
Start: 2021-03-21 | End: 2021-03-23

## 2021-03-21 RX ADMIN — HEPARIN SODIUM 5000 UNIT(S): 5000 INJECTION INTRAVENOUS; SUBCUTANEOUS at 01:15

## 2021-03-21 RX ADMIN — Medication 975 MILLIGRAM(S): at 11:27

## 2021-03-21 RX ADMIN — OXYCODONE HYDROCHLORIDE 10 MILLIGRAM(S): 5 TABLET ORAL at 11:42

## 2021-03-21 RX ADMIN — GABAPENTIN 300 MILLIGRAM(S): 400 CAPSULE ORAL at 22:36

## 2021-03-21 RX ADMIN — HEPARIN SODIUM 5000 UNIT(S): 5000 INJECTION INTRAVENOUS; SUBCUTANEOUS at 08:00

## 2021-03-21 RX ADMIN — OXYCODONE HYDROCHLORIDE 10 MILLIGRAM(S): 5 TABLET ORAL at 12:15

## 2021-03-21 RX ADMIN — GABAPENTIN 300 MILLIGRAM(S): 400 CAPSULE ORAL at 15:00

## 2021-03-21 RX ADMIN — Medication 975 MILLIGRAM(S): at 06:41

## 2021-03-21 RX ADMIN — CYCLOBENZAPRINE HYDROCHLORIDE 10 MILLIGRAM(S): 10 TABLET, FILM COATED ORAL at 01:14

## 2021-03-21 RX ADMIN — Medication 975 MILLIGRAM(S): at 01:55

## 2021-03-21 RX ADMIN — Medication 975 MILLIGRAM(S): at 01:13

## 2021-03-21 RX ADMIN — CYCLOBENZAPRINE HYDROCHLORIDE 10 MILLIGRAM(S): 10 TABLET, FILM COATED ORAL at 10:34

## 2021-03-21 RX ADMIN — SODIUM CHLORIDE 125 MILLILITER(S): 9 INJECTION, SOLUTION INTRAVENOUS at 08:00

## 2021-03-21 RX ADMIN — SODIUM CHLORIDE 125 MILLILITER(S): 9 INJECTION, SOLUTION INTRAVENOUS at 07:19

## 2021-03-21 RX ADMIN — HYDROMORPHONE HYDROCHLORIDE 30 MILLILITER(S): 2 INJECTION INTRAMUSCULAR; INTRAVENOUS; SUBCUTANEOUS at 07:20

## 2021-03-21 RX ADMIN — CYCLOBENZAPRINE HYDROCHLORIDE 10 MILLIGRAM(S): 10 TABLET, FILM COATED ORAL at 17:14

## 2021-03-21 RX ADMIN — OXYCODONE HYDROCHLORIDE 10 MILLIGRAM(S): 5 TABLET ORAL at 17:50

## 2021-03-21 RX ADMIN — Medication 975 MILLIGRAM(S): at 17:14

## 2021-03-21 RX ADMIN — Medication 975 MILLIGRAM(S): at 07:19

## 2021-03-21 RX ADMIN — FAMOTIDINE 20 MILLIGRAM(S): 10 INJECTION INTRAVENOUS at 11:27

## 2021-03-21 RX ADMIN — HEPARIN SODIUM 5000 UNIT(S): 5000 INJECTION INTRAVENOUS; SUBCUTANEOUS at 19:57

## 2021-03-21 RX ADMIN — Medication 975 MILLIGRAM(S): at 12:00

## 2021-03-21 RX ADMIN — GABAPENTIN 300 MILLIGRAM(S): 400 CAPSULE ORAL at 05:31

## 2021-03-21 RX ADMIN — SODIUM CHLORIDE 3 MILLILITER(S): 9 INJECTION INTRAMUSCULAR; INTRAVENOUS; SUBCUTANEOUS at 22:37

## 2021-03-21 NOTE — PROGRESS NOTE ADULT - PROBLEM SELECTOR PLAN 1
Neuro: Pain control persistently an issue even w/ PCA- in consultation w/ pain service decision made to continue PCA for additional day yesterday, will attempt to advance today.  Home flexaril added on  CV: hemodynamically stable, monitor vitals, AM CBC pending  Pulm: saturating well on room air, encourage oob/amb, continuous pulse ox while on PCA  GI: regular diet  : voiding spontaneously  Heme: SCDs, HSQ for DVT ppx, f/u AM H&H, early ambulation  ID: afebrile  Dispo: continue routine post-op care    Mimi Ivory PGY2.

## 2021-03-21 NOTE — PROGRESS NOTE ADULT - SUBJECTIVE AND OBJECTIVE BOX
POD#2    Patient seen and examined at bedside, no acute overnight events. Patient w/ persistent complaints of pain in the context of acute on chronic pain issue.  Patient is ambulating, passing flatus, voiding spontaneously, and tolerating regular diet. Denies CP, SOB, N/V, fevers, and chills.    Vital Signs Last 24 Hours  T(C): 36.8 (03-21-21 @ 05:27), Max: 37.6 (03-20-21 @ 05:48)  HR: 65 (03-21-21 @ 05:27) (60 - 83)  BP: 97/72 (03-21-21 @ 05:27) (91/52 - 120/74)  RR: 17 (03-21-21 @ 05:27) (16 - 18)  SpO2: 98% (03-21-21 @ 05:27) (96% - 100%)    I&O's Detail    19 Mar 2021 07:01  -  20 Mar 2021 07:00  --------------------------------------------------------  IN:    IV PiggyBack: 400 mL    Lactated Ringers: 1375 mL    Oral Fluid: 360 mL  Total IN: 2135 mL    OUT:    Indwelling Catheter - Urethral (mL): 1800 mL  Total OUT: 1800 mL    Total NET: 335 mL      20 Mar 2021 07:01  -  21 Mar 2021 05:38  --------------------------------------------------------  IN:    IV PiggyBack: 100 mL    Lactated Ringers: 2875 mL  Total IN: 2975 mL    OUT:    Indwelling Catheter - Urethral (mL): 2050 mL    Voided (mL): 0 mL  Total OUT: 2050 mL    Total NET: 925 mL    General: NAD  CV: RR, S1, S2  Lungs: CTA b/l, good air flow b/l   Abdomen: Soft, mildly-tender to palpation diffusely, softly distended, normoactive bowel sounds  Incision: pfannenstiel incision sites, dermabond dressing, CDI  : s[potting on pad  Ext: warm and well perfused    Labs:             11.9   8.78  )-----------( 159      ( 03-20 @ 07:03 )             36.7     MEDICATIONS  (STANDING):  acetaminophen   Tablet .. 975 milliGRAM(s) Oral every 6 hours  cyclobenzaprine 10 milliGRAM(s) Oral every 8 hours  famotidine    Tablet 20 milliGRAM(s) Oral daily  gabapentin 300 milliGRAM(s) Oral three times a day  heparin   Injectable 5000 Unit(s) SubCutaneous every 8 hours  HYDROmorphone PCA (1 mG/mL) 30 milliLiter(s) PCA Continuous PCA Continuous  lactated ringers. 1000 milliLiter(s) (125 mL/Hr) IV Continuous <Continuous>  lactated ringers. 1000 milliLiter(s) (30 mL/Hr) IV Continuous <Continuous>    MEDICATIONS  (PRN):  HYDROmorphone PCA (1 mG/mL) Rescue Clinician Bolus 0.5 milliGRAM(s) IV Push every 15 minutes PRN for Pain Scale GREATER THAN 6  naloxone Injectable 0.1 milliGRAM(s) IV Push every 3 minutes PRN For ANY of the following changes in patient status:  A. RR LESS THAN 10 breaths per minute, B. Oxygen saturation LESS THAN 90%, C. Sedation score of 6  ondansetron Injectable 8 milliGRAM(s) IV Push every 8 hours PRN Nausea and/or Vomiting  simethicone 80 milliGRAM(s) Chew every 6 hours PRN Gas

## 2021-03-21 NOTE — PROGRESS NOTE ADULT - SUBJECTIVE AND OBJECTIVE BOX
Anesthesia Pain Management Service    SUBJECTIVE: Patient is doing well with IV PCA and no significant problems reported.    Pain Scale Score	At rest: ___ 	With Activity: ___ 	[X ] Refer to charted pain scores    THERAPY:    [ ] IV PCA Morphine		[ ] 5 mg/mL	[ ] 1 mg/mL  [X ] IV PCA Hydromorphone	[ ] 5 mg/mL	[X ] 1 mg/mL  [ ] IV PCA Fentanyl		[ ] 50 micrograms/mL    Demand dose __0.25_ lockout __6_ (minutes) Continuous Rate _0__ Total: 14.2cc___  Daily      MEDICATIONS  (STANDING):  acetaminophen   Tablet .. 975 milliGRAM(s) Oral every 6 hours  cyclobenzaprine 10 milliGRAM(s) Oral every 8 hours  famotidine    Tablet 20 milliGRAM(s) Oral daily  gabapentin 300 milliGRAM(s) Oral three times a day  heparin   Injectable 5000 Unit(s) SubCutaneous every 12 hours  HYDROmorphone PCA (1 mG/mL) 30 milliLiter(s) PCA Continuous PCA Continuous  lactated ringers. 1000 milliLiter(s) (125 mL/Hr) IV Continuous <Continuous>  lactated ringers. 1000 milliLiter(s) (30 mL/Hr) IV Continuous <Continuous>    MEDICATIONS  (PRN):  HYDROmorphone PCA (1 mG/mL) Rescue Clinician Bolus 0.5 milliGRAM(s) IV Push every 15 minutes PRN for Pain Scale GREATER THAN 6  naloxone Injectable 0.1 milliGRAM(s) IV Push every 3 minutes PRN For ANY of the following changes in patient status:  A. RR LESS THAN 10 breaths per minute, B. Oxygen saturation LESS THAN 90%, C. Sedation score of 6  ondansetron Injectable 8 milliGRAM(s) IV Push every 8 hours PRN Nausea and/or Vomiting  oxyCODONE    IR 5 milliGRAM(s) Oral every 6 hours PRN Moderate Pain (4 - 6)  oxyCODONE    IR 10 milliGRAM(s) Oral every 6 hours PRN Severe Pain (7 - 10)  simethicone 80 milliGRAM(s) Chew every 6 hours PRN Gas      OBJECTIVE:    Sedation Score:	[ X] Alert	[ ] Drowsy 	[ ] Arousable	[ ] Asleep	[ ] Unresponsive    Side Effects:	[X ] None	[ ] Nausea	[ ] Vomiting	[ ] Pruritus  		[ ] Other:    Vital Signs Last 24 Hrs  T(C): 36.7 (21 Mar 2021 10:34), Max: 37.2 (21 Mar 2021 01:53)  T(F): 98.1 (21 Mar 2021 10:34), Max: 98.9 (21 Mar 2021 01:53)  HR: 87 (21 Mar 2021 10:34) (65 - 87)  BP: 96/51 (21 Mar 2021 10:34) (91/52 - 100/53)  BP(mean): --  RR: 16 (21 Mar 2021 10:34) (16 - 18)  SpO2: 97% (21 Mar 2021 10:34) (96% - 100%)    ASSESSMENT/ PLAN    Therapy to  be:	[ ] Continue   [ X] Discontinued   [X ] Change to prn Analgesics    Documentation and Verification of current medications:   [X] Done	[ ] Not done, not elligible    Comments: PRN Oral/IV opioids and/or Adjuvant medication to be ordered at this point.

## 2021-03-22 RX ADMIN — OXYCODONE HYDROCHLORIDE 10 MILLIGRAM(S): 5 TABLET ORAL at 11:00

## 2021-03-22 RX ADMIN — GABAPENTIN 300 MILLIGRAM(S): 400 CAPSULE ORAL at 21:35

## 2021-03-22 RX ADMIN — SODIUM CHLORIDE 3 MILLILITER(S): 9 INJECTION INTRAMUSCULAR; INTRAVENOUS; SUBCUTANEOUS at 13:19

## 2021-03-22 RX ADMIN — OXYCODONE HYDROCHLORIDE 10 MILLIGRAM(S): 5 TABLET ORAL at 16:52

## 2021-03-22 RX ADMIN — Medication 975 MILLIGRAM(S): at 05:17

## 2021-03-22 RX ADMIN — Medication 975 MILLIGRAM(S): at 13:18

## 2021-03-22 RX ADMIN — HEPARIN SODIUM 5000 UNIT(S): 5000 INJECTION INTRAVENOUS; SUBCUTANEOUS at 05:17

## 2021-03-22 RX ADMIN — OXYCODONE HYDROCHLORIDE 10 MILLIGRAM(S): 5 TABLET ORAL at 10:19

## 2021-03-22 RX ADMIN — OXYCODONE HYDROCHLORIDE 5 MILLIGRAM(S): 5 TABLET ORAL at 05:16

## 2021-03-22 RX ADMIN — GABAPENTIN 300 MILLIGRAM(S): 400 CAPSULE ORAL at 05:16

## 2021-03-22 RX ADMIN — Medication 975 MILLIGRAM(S): at 14:00

## 2021-03-22 RX ADMIN — GABAPENTIN 300 MILLIGRAM(S): 400 CAPSULE ORAL at 13:19

## 2021-03-22 RX ADMIN — OXYCODONE HYDROCHLORIDE 10 MILLIGRAM(S): 5 TABLET ORAL at 16:22

## 2021-03-22 RX ADMIN — SODIUM CHLORIDE 3 MILLILITER(S): 9 INJECTION INTRAMUSCULAR; INTRAVENOUS; SUBCUTANEOUS at 05:17

## 2021-03-22 RX ADMIN — Medication 975 MILLIGRAM(S): at 20:27

## 2021-03-22 RX ADMIN — Medication 975 MILLIGRAM(S): at 21:00

## 2021-03-22 RX ADMIN — HEPARIN SODIUM 5000 UNIT(S): 5000 INJECTION INTRAVENOUS; SUBCUTANEOUS at 18:06

## 2021-03-22 RX ADMIN — CYCLOBENZAPRINE HYDROCHLORIDE 10 MILLIGRAM(S): 10 TABLET, FILM COATED ORAL at 02:04

## 2021-03-22 RX ADMIN — FAMOTIDINE 20 MILLIGRAM(S): 10 INJECTION INTRAVENOUS at 13:18

## 2021-03-22 RX ADMIN — SODIUM CHLORIDE 3 MILLILITER(S): 9 INJECTION INTRAMUSCULAR; INTRAVENOUS; SUBCUTANEOUS at 21:36

## 2021-03-22 NOTE — PROGRESS NOTE ADULT - SUBJECTIVE AND OBJECTIVE BOX
GYN Progress Note    Patient seen and examined at bedside, no acute overnight events. Patient reports poor pain control over the weekend, somewhat controlled on oral medications. She is tolerating regular diet, but with little appetite. She reports mild nausea, but no vomiting. Denies flatus. Denies, fevers, chills, CP, SOB, lightheadedness, dizziness. OOB, ambulating to bathroom. Voiding spontaneously.     Vital Signs Last 24 Hours  T(C): 37.3 (03-22-21 @ 05:14), Max: 37.4 (03-22-21 @ 02:09)  HR: 95 (03-22-21 @ 05:14) (82 - 98)  BP: 126/65 (03-22-21 @ 05:14) (96/51 - 126/65)  RR: 18 (03-22-21 @ 05:14) (16 - 18)  SpO2: 98% (03-22-21 @ 05:14) (95% - 98%)      Physical Exam:  General: NAD, AOx3   CV: RRR   Lungs: CTAB  Abdomen: Soft, appropriately-tender, non-distended, low-transverse incision c/d/i   Ext: No pain or swelling in b/l LE       Labs:             10.4<L>  5.89  )-----------( 137<L>    ( 03-21 @ 06:20 )             33.3<L>               11.9   8.78  )-----------( 159      ( 03-20 @ 07:03 )             36.7         MEDICATIONS  (STANDING):  acetaminophen   Tablet .. 975 milliGRAM(s) Oral every 6 hours  cyclobenzaprine 10 milliGRAM(s) Oral every 8 hours  famotidine    Tablet 20 milliGRAM(s) Oral daily  gabapentin 300 milliGRAM(s) Oral three times a day  heparin   Injectable 5000 Unit(s) SubCutaneous every 12 hours  sodium chloride 0.9% lock flush 3 milliLiter(s) IV Push every 8 hours    MEDICATIONS  (PRN):  HYDROmorphone PCA (1 mG/mL) Rescue Clinician Bolus 0.5 milliGRAM(s) IV Push every 15 minutes PRN for Pain Scale GREATER THAN 6  naloxone Injectable 0.1 milliGRAM(s) IV Push every 3 minutes PRN For ANY of the following changes in patient status:  A. RR LESS THAN 10 breaths per minute, B. Oxygen saturation LESS THAN 90%, C. Sedation score of 6  ondansetron Injectable 8 milliGRAM(s) IV Push every 8 hours PRN Nausea and/or Vomiting  oxyCODONE    IR 5 milliGRAM(s) Oral every 6 hours PRN Moderate Pain (4 - 6)  oxyCODONE    IR 10 milliGRAM(s) Oral every 6 hours PRN Severe Pain (7 - 10)  simethicone 80 milliGRAM(s) Chew every 6 hours PRN Gas

## 2021-03-22 NOTE — PROGRESS NOTE ADULT - ATTENDING COMMENTS
PT SEEN AND EVALUATED  HAS ISSUES WITH PAIN MANAGEMENT---H/O CHRONIC PAIN  SEEN BY PAIN-MANAGEMENT TEAM EARLIER TODAY--APPRECIATE FOLLOW UP AND RECOMMENDATIONS  WILL DC PCA NOW AND START PO/IV MEDS PRN  ENCOURAGED OOB/AMBULATION   INCENTIVE SPIROMETER PROVIDED  STATES NO FLATUS SO FAR---HAD VOMITING YESTERDAY--WILL CONTINUE TO MONITOR DIET TOLERANCE
PT SEEN AND EVALUATED  NO VOMITING YESTERDAY/TODAY  DENIES PASSING FLATUS  C/O PAIN---APPRECIATE PAIN-MANAGEMENT F/U  ENCOURAGED OOB/INCENTIVE SPIROMETER USE
Agreed with above note   dc james   advance orderes  encourage ambulation  pain management

## 2021-03-23 ENCOUNTER — TRANSCRIPTION ENCOUNTER (OUTPATIENT)
Age: 46
End: 2021-03-23

## 2021-03-23 VITALS
SYSTOLIC BLOOD PRESSURE: 104 MMHG | DIASTOLIC BLOOD PRESSURE: 72 MMHG | OXYGEN SATURATION: 97 % | RESPIRATION RATE: 19 BRPM | HEART RATE: 79 BPM | TEMPERATURE: 99 F

## 2021-03-23 RX ORDER — GABAPENTIN 400 MG/1
1 CAPSULE ORAL
Qty: 0 | Refills: 0 | DISCHARGE

## 2021-03-23 RX ORDER — SIMETHICONE 80 MG/1
1 TABLET, CHEWABLE ORAL
Qty: 30 | Refills: 0
Start: 2021-03-23

## 2021-03-23 RX ORDER — OXYCODONE HYDROCHLORIDE 5 MG/1
1 TABLET ORAL
Qty: 12 | Refills: 0
Start: 2021-03-23

## 2021-03-23 RX ADMIN — Medication 975 MILLIGRAM(S): at 15:35

## 2021-03-23 RX ADMIN — GABAPENTIN 300 MILLIGRAM(S): 400 CAPSULE ORAL at 13:02

## 2021-03-23 RX ADMIN — SODIUM CHLORIDE 3 MILLILITER(S): 9 INJECTION INTRAMUSCULAR; INTRAVENOUS; SUBCUTANEOUS at 13:01

## 2021-03-23 RX ADMIN — HEPARIN SODIUM 5000 UNIT(S): 5000 INJECTION INTRAVENOUS; SUBCUTANEOUS at 05:03

## 2021-03-23 RX ADMIN — GABAPENTIN 300 MILLIGRAM(S): 400 CAPSULE ORAL at 05:04

## 2021-03-23 RX ADMIN — OXYCODONE HYDROCHLORIDE 10 MILLIGRAM(S): 5 TABLET ORAL at 12:20

## 2021-03-23 RX ADMIN — FAMOTIDINE 20 MILLIGRAM(S): 10 INJECTION INTRAVENOUS at 11:47

## 2021-03-23 RX ADMIN — SIMETHICONE 80 MILLIGRAM(S): 80 TABLET, CHEWABLE ORAL at 11:01

## 2021-03-23 RX ADMIN — OXYCODONE HYDROCHLORIDE 10 MILLIGRAM(S): 5 TABLET ORAL at 05:40

## 2021-03-23 RX ADMIN — OXYCODONE HYDROCHLORIDE 10 MILLIGRAM(S): 5 TABLET ORAL at 11:19

## 2021-03-23 RX ADMIN — SODIUM CHLORIDE 3 MILLILITER(S): 9 INJECTION INTRAMUSCULAR; INTRAVENOUS; SUBCUTANEOUS at 05:13

## 2021-03-23 RX ADMIN — Medication 975 MILLIGRAM(S): at 08:26

## 2021-03-23 RX ADMIN — OXYCODONE HYDROCHLORIDE 10 MILLIGRAM(S): 5 TABLET ORAL at 04:40

## 2021-03-23 NOTE — PROGRESS NOTE ADULT - ASSESSMENT
45 yo POD#4 s/p supracervical hysterectomy and b/l salpingectomy, EBL 50.  Patient hemodynamically stable and overall doing well postoperatively. Patient with h/o chronic pain and poor pain control inpatient postoperatively, today reporting improvement in pain. Denies flatus, but tolerating regular diet with normoactive bowel sounds.   - Tylenol, Motrin, Oxycodone PRN for pain   - Continue home meds - Gabapentin, Flexeril   - Reg diet, mylicon, pepcid for GI ppx   - Encourage OOB, ambulation   - Encourage incentive spirometry   - HSQ for DVT ppx  - Routine postop care  - Discharge planning
45 yo POD#3 s/p supracervical hysterectomy and b/l salpingectomy, EBL 50.  Patient hemodynamically stable and overall doing well postoperatively. Patient with h/o chronic pain and poor pain control inpatient postoperatively. Denies flatus, but tolerating regular diet.   - Tylenol, Motrin, Oxycodone PRN for pain   - Continue home meds - Gabapentin, Flexeril   - Reg diet, mylicon, pepcid for GI ppx   - Encourage OOB, ambulation   - Encourage incentive spirometry   - HSQ for DVT ppx  - Routine postop care
46y POD# 2 s/p supracervical hysterectomy and b/l salpingectomy, EBL 50.  Patient is stable and doing well.  Continue post-operative care.
46y POD# 1 s/p supracervical hysterectomy and b/l salpingectomy, EBL 50.  Patient is stable and doing well.  Patient is meeting all post op milestones.

## 2021-03-23 NOTE — DISCHARGE NOTE NURSING/CASE MANAGEMENT/SOCIAL WORK - PATIENT PORTAL LINK FT
You can access the FollowMyHealth Patient Portal offered by [facility] by registering at the following website: [email or web address]. By joining [employer]’s FollowMyHealth portal, you will also be able to view your health information using other applications (apps) compatible with our system.

## 2021-03-23 NOTE — CHART NOTE - NSCHARTNOTEFT_GEN_A_CORE
Attending Note    Patient evaluated at bedside.  Patient is tolerating regular diet, ambulating. Reports pain is under control with oral analgesia but states she does not get her pain meds on time. Denies any shortness of breath, dysuria, nausea or vomiting. No flatus as per patient.   VS T 98.2  P 88 R 18  /57    Heent nl  abd soft Incision C/D/I  ext no calf tenderness b/l  neuro AAOx 3    A: POD#4 s/p Abdominal hysterectomy, hx of chronic pain, clinically stable  P: Encourage ambulation     Analgesia prn. Advised patient she can continue her usual regimen for pain at home     Mylicon prn     DC home today once + flatus     Discharge instructions given to patient     Priyank

## 2021-03-23 NOTE — PROGRESS NOTE ADULT - SUBJECTIVE AND OBJECTIVE BOX
GYN Progress Note      POD#4   HD#5    Patient seen and examined at bedside.  No acute events overnight. No acute complaints.  Reports better pain control than yesterday, and was able to sleep.  Patient is ambulating and tolerating regular diet.  No vomiting overnight, reports minimal nausea at baseline.  Reports no passage of flatus, no complaints of gas pain or distension.  Patient is voiding spontaneously.  Denies CP, SOB, N/V, fevers, and chills.    Vital Signs Last 24 Hours  T(C): 36.8 (03-23-21 @ 04:33), Max: 37 (03-22-21 @ 16:56)  HR: 72 (03-23-21 @ 04:33) (64 - 94)  BP: 99/69 (03-23-21 @ 04:33) (96/53 - 107/63)  RR: 18 (03-23-21 @ 04:33) (17 - 18)  SpO2: 98% (03-23-21 @ 04:33) (96% - 100%)    I&O's Summary    21 Mar 2021 07:01  -  22 Mar 2021 07:00  --------------------------------------------------------  IN: 540 mL / OUT: 1650 mL / NET: -1110 mL    22 Mar 2021 07:01  -  23 Mar 2021 06:35  --------------------------------------------------------  IN: 120 mL / OUT: 400 mL / NET: -280 mL        Physical Exam:  General: NAD  CV: RRR  Lungs: CTAB  Abdomen: soft, appropriately-tender, softly distended, normoactive bowel sounds  Incision: low transverse CDI w/ dressing in place  : no bleeding on pad  Ext: no pain or swelling     Labs:                        10.4   5.89  )-----------( 137      ( 21 Mar 2021 06:20 )             33.3   baso x      eos x      imm gran x      lymph x      mono x      poly x                            11.9   8.78  )-----------( 159      ( 20 Mar 2021 07:03 )             36.7   baso x      eos x      imm gran x      lymph x      mono x      poly x          MEDICATIONS  (STANDING):  acetaminophen   Tablet .. 975 milliGRAM(s) Oral every 6 hours  cyclobenzaprine 10 milliGRAM(s) Oral every 8 hours  famotidine    Tablet 20 milliGRAM(s) Oral daily  gabapentin 300 milliGRAM(s) Oral three times a day  heparin   Injectable 5000 Unit(s) SubCutaneous every 12 hours  sodium chloride 0.9% lock flush 3 milliLiter(s) IV Push every 8 hours    MEDICATIONS  (PRN):  ondansetron Injectable 8 milliGRAM(s) IV Push every 8 hours PRN Nausea and/or Vomiting  oxyCODONE    IR 5 milliGRAM(s) Oral every 6 hours PRN Moderate Pain (4 - 6)  oxyCODONE    IR 10 milliGRAM(s) Oral every 6 hours PRN Severe Pain (7 - 10)  simethicone 80 milliGRAM(s) Chew every 6 hours PRN Gas

## 2021-03-23 NOTE — DISCHARGE NOTE NURSING/CASE MANAGEMENT/SOCIAL WORK - NSDCPNINST_GEN_ALL_CORE
notify md if having fever of 101 or above if nauseous ,vomiting ,incision site looks red swollen or discharge noted

## 2021-03-25 LAB — SURGICAL PATHOLOGY STUDY: SIGNIFICANT CHANGE UP

## 2021-04-09 LAB — SARS-COV-2 N GENE NPH QL NAA+PROBE: NOT DETECTED

## 2021-04-14 ENCOUNTER — TRANSCRIPTION ENCOUNTER (OUTPATIENT)
Age: 46
End: 2021-04-14

## 2021-04-15 ENCOUNTER — APPOINTMENT (OUTPATIENT)
Dept: GASTROENTEROLOGY | Facility: CLINIC | Age: 46
End: 2021-04-15
Payer: COMMERCIAL

## 2021-04-15 PROCEDURE — 43239 EGD BIOPSY SINGLE/MULTIPLE: CPT

## 2021-04-15 PROCEDURE — 45380 COLONOSCOPY AND BIOPSY: CPT

## 2021-04-19 NOTE — ASU PREOP CHECKLIST - WEIGHT IN LBS
Last Written Prescription Date:  1/13/21  Last Fill Quantity: 10,  # refills: 0   Last office visit: 11/12/2020 with prescribing provider:  Becki Avery   VIRTUAL visit 3/23/21 with Becki Avery  Future Office Visit: none    Routing refill request to provider for review/approval because:  Recent ER visit on 4/18/21 recommended F/U with PCP in 3 days.    [employer] message sent to patient advising of need for F/U with PCP.    Please advise.    Ayana Olivera, RN, BSN  [facility]                        
[phone]

## 2021-04-22 PROBLEM — M54.9 DORSALGIA, UNSPECIFIED: Chronic | Status: ACTIVE | Noted: 2019-07-22

## 2021-04-22 PROBLEM — K21.9 GASTRO-ESOPHAGEAL REFLUX DISEASE WITHOUT ESOPHAGITIS: Chronic | Status: ACTIVE | Noted: 2021-03-09

## 2021-05-20 NOTE — ED PROVIDER NOTE - DATE/TIME 1
Due to longstanding nature, severity, and good visual function, surgery not indicated. 25-Jan-2019 16:20

## 2021-06-28 ENCOUNTER — APPOINTMENT (OUTPATIENT)
Dept: GASTROENTEROLOGY | Facility: CLINIC | Age: 46
End: 2021-06-28

## 2021-08-27 ENCOUNTER — APPOINTMENT (OUTPATIENT)
Dept: GASTROENTEROLOGY | Facility: CLINIC | Age: 46
End: 2021-08-27

## 2021-09-24 ENCOUNTER — APPOINTMENT (OUTPATIENT)
Dept: GASTROENTEROLOGY | Facility: CLINIC | Age: 46
End: 2021-09-24
Payer: COMMERCIAL

## 2021-09-24 DIAGNOSIS — R11.10 VOMITING, UNSPECIFIED: ICD-10-CM

## 2021-09-24 DIAGNOSIS — R12 HEARTBURN: ICD-10-CM

## 2021-09-24 DIAGNOSIS — K57.30 DIVERTICULOSIS OF LARGE INTESTINE W/OUT PERFORATION OR ABSCESS W/OUT BLEEDING: ICD-10-CM

## 2021-09-24 DIAGNOSIS — K64.4 RESIDUAL HEMORRHOIDAL SKIN TAGS: ICD-10-CM

## 2021-09-24 DIAGNOSIS — K44.9 DIAPHRAGMATIC HERNIA W/OUT OBSTRUCTION OR GANGRENE: ICD-10-CM

## 2021-09-24 PROCEDURE — 99215 OFFICE O/P EST HI 40 MIN: CPT | Mod: 95

## 2021-09-24 RX ORDER — BUTALBITAL, ACETAMINOPHEN AND CAFFEINE 325; 50; 40 MG/1; MG/1; MG/1
50-325-40 TABLET ORAL
Refills: 0 | Status: ACTIVE | COMMUNITY

## 2021-09-24 RX ORDER — SODIUM PICOSULFATE, MAGNESIUM OXIDE, AND ANHYDROUS CITRIC ACID 10; 3.5; 12 MG/160ML; G/160ML; G/160ML
10-3.5-12 MG-GM LIQUID ORAL
Qty: 1 | Refills: 0 | Status: DISCONTINUED | COMMUNITY
Start: 2021-02-26 | End: 2021-09-24

## 2021-09-24 RX ORDER — HYDROCORTISONE 2.5% 25 MG/G
2.5 CREAM TOPICAL
Qty: 1 | Refills: 1 | Status: ACTIVE | COMMUNITY
Start: 2021-09-24 | End: 1900-01-01

## 2021-09-24 RX ORDER — OMEPRAZOLE 10 MG/1
10 CAPSULE, DELAYED RELEASE ORAL
Refills: 0 | Status: DISCONTINUED | COMMUNITY
End: 2021-09-24

## 2021-09-24 RX ORDER — LUBIPROSTONE 24 UG/1
24 CAPSULE ORAL
Qty: 180 | Refills: 1 | Status: ACTIVE | COMMUNITY
Start: 2021-09-24 | End: 1900-01-01
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
## 2021-09-25 NOTE — CONSULT LETTER
[FreeTextEntry1] : Dear Dr. Nam Case,\par \[facility] I had the pleasure of seeing your patient RACHELLE CACERES in the office today.  My office note is attached. PLEASE READ THE "ASSESSMENT" SECTION OF THE NOTE TO SEE MY IMPRESSION AND PLAN.\par \par Thank you very much for allowing me to participate in the care of your patient.\par \[facility] Sincerely,\[facility] \[facility] Dayron Morris M.D., FAC, [facility] Director, Celiac Program at [facility]  of Medicine\[facility] and [employer] at [facility] Practice Director,\[facility] - Gastroenterology/Internal Medicine at [place]\[facility] [address]\[place] [address]\[facility] Tel: [phone]\[facility] Email: [email or web address] \[facility] \[facility] The attached note has been created using a voice recognition system (Dragon).  There may be some misspellings and typos.  Please call my office if you have any issues or questions.
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
## 2021-09-25 NOTE — CONSULT LETTER
[FreeTextEntry1] : Dear Dr. Nam Case,\par \[facility] I had the pleasure of seeing your patient RACHELLE CACERES in the office today.  My office note is attached. PLEASE READ THE "ASSESSMENT" SECTION OF THE NOTE TO SEE MY IMPRESSION AND PLAN.\par \par Thank you very much for allowing me to participate in the care of your patient.\par \[facility] Sincerely,\[facility] \[facility] aDyron Morris M.D., FAC, [facility] Director, Celiac Program at [facility]  of Medicine\[facility] and [employer] at [facility] Practice Director,\[facility] - Gastroenterology/Internal Medicine at [place]\[facility] [address]\[place] [address]\[facility] Tel: [phone]\[facility] Email: [email or web address] \[facility] \[facility] The attached note has been created using a voice recognition system (Dragon).  There may be some misspellings and typos.  Please call my office if you have any issues or questions.

## 2021-09-25 NOTE — REASON FOR VISIT
[FreeTextEntry1] : Reflux esophagitis, hiatal hernia, vomiting, constipation, diverticulosis, hemorrhoids
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
## 2021-09-30 ENCOUNTER — NON-APPOINTMENT (OUTPATIENT)
Age: 46
End: 2021-09-30

## 2021-10-26 ENCOUNTER — APPOINTMENT (OUTPATIENT)
Dept: GASTROENTEROLOGY | Facility: CLINIC | Age: 46
End: 2021-10-26
Payer: COMMERCIAL

## 2021-10-26 PROCEDURE — 99213 OFFICE O/P EST LOW 20 MIN: CPT | Mod: 95

## 2021-10-26 NOTE — CONSULT LETTER
[FreeTextEntry1] : Dear Dr. Nam Case,\par \[facility] I had the pleasure of seeing your patient RACHELLE CACERES in the office today.  My office note is attached. PLEASE READ THE "ASSESSMENT" SECTION OF THE NOTE TO SEE MY IMPRESSION AND PLAN.\par \par Thank you very much for allowing me to participate in the care of your patient.\par \[facility] Sincerely,\[facility] \[facility] Dayron Morris M.D., FAC, [facility] Director, Celiac Program at [facility]  of Medicine\[facility] and [employer] at [facility] Practice Director,\[facility] - Gastroenterology/Internal Medicine at [place]\[facility] [address]\[place] [address]\[facility] Tel: [phone]\[facility] Email: [email or web address] \[facility] \[facility] The attached note has been created using a voice recognition system (Dragon).  There may be some misspellings and typos.  Please call my office if you have any issues or questions.
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
## 2021-12-07 NOTE — PATIENT PROFILE ADULT - NSPROGENARRIVEDFROM_GEN_A_NUR
[FreeTextEntry1] : The patient is a 40-year-old female who presents with complaints of amenorrhea. Patient states her last menstrual period October 10, 2021 she is late for her period. Patient states she is trying to conceive. Patient has a negative home pregnancy test. Discussed this issue the patient patient reassured that isolated episode of irregular cycle is not necessarily indicative of anything worrisome. However will perform sonogram and get hormonal lab work and blood pregnancy test. Today's UCG is negative home

## 2021-12-08 NOTE — ASU PATIENT PROFILE, ADULT - ABLE TO REACH PT
From: Shahrzad Soto  To: Tjoksana Olson  Sent: 12/8/2021 9:19 AM CST  Subject: Hello    Hi I have sent over papers I had tested positive for Covid 19 and I need them filled out to get short term disability. Here are my test results. I also came to the walk in before I got them back because I felt really sick I do have a doctors note for that. I’ve been off work since 11-26-21 and can not return to work until my test says I am negative to go back I got tested on. Monday 12/6/21   yes time change/yes

## 2022-01-06 NOTE — ASU PATIENT PROFILE, ADULT - NS PRO INFO GIVEN TO
Ongoing SW/CM Assessment/Plan of Care Note     See SW/CM flowsheets for goals and other objective data.    Patient/Family discharge goal (s):  Goal #1: Psychosocial needs assessed       Progress note:   Remains in ICU, DCP TBD, but plans on returning home when medically ready. Was active with  [facility] RN. Will request orders to resume care if appropriate. CM continue to follow.          patient

## 2022-04-29 ENCOUNTER — RX RENEWAL (OUTPATIENT)
Age: 47
End: 2022-04-29

## 2022-07-07 NOTE — ED ADULT NURSE NOTE - NS ED NURSE RECORD ANOTHER HT AND WT
7/7/2022         RE: Nury Cárdenas  [address] [address]        Dear Colleague,    Thank you for referring your patient, Nury Cárdenas, to the [facility]. Please see a copy of my visit note below.     Current Eye Medications:  None.       Subjective:  Dr. Browning recommended an urgent evaluation of her right eye, secondary to potential Shingles on the right side of her face.  She was given a prescription for Acyclovir yesterday, but hasn't started taking it.  Rash started on the right side of her upper forehead, near her hairline, a couple months ago.  She also had one lesion near her brow area, and some on the back of her leg.  No specific eye pain or redness, but feels her vision in each eye is slightly blurry.    Has not had either shingles vaccine.       Objective:  See Ophthalmology Exam.       Assessment:  History of right facial rash (likely V1 zoster), but now appears mostly healed.  No ocular findings of concern.      Plan:  Consult Dr. Jackson regarding acyclovir.  Call if your eye become sore, achy, red, or blurry.  Call in November 2022 for an appointment in March 2023 for Complete Exam    Dr. Mcleod [phone]            Again, thank you for allowing me to participate in the care of your patient.        Sincerely,        Wiley Mcleod MD     No

## 2022-07-25 ENCOUNTER — RX RENEWAL (OUTPATIENT)
Age: 47
End: 2022-07-25

## 2022-08-22 ENCOUNTER — NON-APPOINTMENT (OUTPATIENT)
Age: 47
End: 2022-08-22

## 2022-08-22 ENCOUNTER — APPOINTMENT (OUTPATIENT)
Dept: GASTROENTEROLOGY | Facility: CLINIC | Age: 47
End: 2022-08-22

## 2022-08-22 DIAGNOSIS — K59.00 CONSTIPATION, UNSPECIFIED: ICD-10-CM

## 2022-08-22 DIAGNOSIS — K21.00 GASTRO-ESOPHAGEAL REFLUX DISEASE WITH ESOPHAGITIS, WITHOUT BLEEDING: ICD-10-CM

## 2022-08-22 PROCEDURE — 99214 OFFICE O/P EST MOD 30 MIN: CPT | Mod: 95

## 2022-08-22 RX ORDER — FAMOTIDINE 40 MG/1
40 TABLET, FILM COATED ORAL
Qty: 90 | Refills: 2 | Status: ACTIVE | COMMUNITY
Start: 2021-09-24 | End: 1900-01-01

## 2022-08-22 RX ORDER — LINACLOTIDE 290 UG/1
290 CAPSULE, GELATIN COATED ORAL
Qty: 90 | Refills: 2 | Status: ACTIVE | COMMUNITY
Start: 2021-09-29 | End: 1900-01-01

## 2022-08-22 NOTE — CONSULT LETTER
[FreeTextEntry1] : Dear Dr. Nam Case,\par \par I had the pleasure of seeing your patient RACHELLE CACERES in the office today.  My office note is attached. PLEASE READ THE "ASSESSMENT" SECTION OF THE NOTE TO SEE MY IMPRESSION AND PLAN.\par \par Thank you very much for allowing me to participate in the care of your patient.\par \par Sincerely,\par \par Dayron Morris M.D., FAC, FACP\par Director, Celiac Program at [facility]\par  of Medicine, [employer] at [facility]\par Adjunct  of Medicine, [facility] [facility] Practice Director, [facility] - Gastroenterology at [place]\[facility] [address]\[place] [address]\par Tel: [phone]\par Email: [email or web address]\par \par \par The attached note has been created using a voice recognition system (Dragon).  There may be some misspellings and typos.  Please call my office if you have any issues or questions.
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
## 2022-09-14 NOTE — DISCHARGE NOTE NURSING/CASE MANAGEMENT/SOCIAL WORK - NSDCPNDISPN_GEN_ALL_CORE
What Type Of Note Output Would You Prefer (Optional)?: Standard Output
How Severe Is Your Skin Lesion?: mild
Has Your Skin Lesion Been Treated?: not been treated
Is This A New Presentation, Or A Follow-Up?: Skin Lesion
Education provided on the pain management plan of care/Side effects of pain management treatment/Activities of daily living, including home environment that might     exacerbate pain or reduce effectiveness of the pain management plan of care as well as strategies to address these issues/Safe use, storage and disposal of opioids when prescribed

## 2022-11-28 ENCOUNTER — RX RENEWAL (OUTPATIENT)
Age: 47
End: 2022-11-28

## 2022-11-28 RX ORDER — PANTOPRAZOLE 40 MG/1
40 TABLET, DELAYED RELEASE ORAL DAILY
Qty: 90 | Refills: 1 | Status: ACTIVE | COMMUNITY
Start: 2021-02-26 | End: 1900-01-01

## 2023-02-27 ENCOUNTER — NON-APPOINTMENT (OUTPATIENT)
Age: 48
End: 2023-02-27

## 2024-02-19 NOTE — ED ADULT NURSE NOTE - NSFALLRSKINDICATORS_ED_ALL_ED
Detail Level: Detailed Add 77669 Cpt? (Important Note: In 2017 The Use Of 95498 Is Being Tracked By Cms To Determine Future Global Period Reimbursement For Global Periods): no no

## 2024-04-05 ENCOUNTER — EMERGENCY (EMERGENCY)
Facility: HOSPITAL | Age: 49
LOS: 1 days | Discharge: HOME | End: 2024-04-05
Attending: EMERGENCY MEDICINE
Payer: SELF-PAY

## 2024-04-05 VITALS
DIASTOLIC BLOOD PRESSURE: 79 MMHG | SYSTOLIC BLOOD PRESSURE: 122 MMHG | WEIGHT: 156.97 LBS | TEMPERATURE: 99 F | OXYGEN SATURATION: 98 % | HEART RATE: 76 BPM | HEIGHT: 67 IN | RESPIRATION RATE: 20 BRPM

## 2024-04-05 DIAGNOSIS — Z98.890 OTHER SPECIFIED POSTPROCEDURAL STATES: Chronic | ICD-10-CM

## 2024-04-05 DIAGNOSIS — Z98.82 BREAST IMPLANT STATUS: Chronic | ICD-10-CM

## 2024-04-05 DIAGNOSIS — Z98.51 TUBAL LIGATION STATUS: Chronic | ICD-10-CM

## 2024-04-05 PROCEDURE — 70450 CT HEAD/BRAIN W/O DYE: CPT | Mod: 26,MC

## 2024-04-05 PROCEDURE — 72125 CT NECK SPINE W/O DYE: CPT | Mod: MC

## 2024-04-05 PROCEDURE — 70450 CT HEAD/BRAIN W/O DYE: CPT | Mod: MC

## 2024-04-05 PROCEDURE — 72125 CT NECK SPINE W/O DYE: CPT | Mod: 26,MC

## 2024-04-05 PROCEDURE — 99284 EMERGENCY DEPT VISIT MOD MDM: CPT | Mod: 25

## 2024-04-05 PROCEDURE — 99284 EMERGENCY DEPT VISIT MOD MDM: CPT

## 2024-04-05 RX ORDER — OXYCODONE AND ACETAMINOPHEN 5; 325 MG/1; MG/1
1 TABLET ORAL ONCE
Refills: 0 | Status: DISCONTINUED | OUTPATIENT
Start: 2024-04-05 | End: 2024-04-05

## 2024-04-05 RX ORDER — METHOCARBAMOL 500 MG/1
1 TABLET, FILM COATED ORAL
Qty: 15 | Refills: 0
Start: 2024-04-05 | End: 2024-04-09

## 2024-04-05 RX ORDER — METHOCARBAMOL 500 MG/1
1500 TABLET, FILM COATED ORAL ONCE
Refills: 0 | Status: COMPLETED | OUTPATIENT
Start: 2024-04-05 | End: 2024-04-05

## 2024-04-05 RX ADMIN — METHOCARBAMOL 1500 MILLIGRAM(S): 500 TABLET, FILM COATED ORAL at 12:00

## 2024-04-05 RX ADMIN — OXYCODONE AND ACETAMINOPHEN 1 TABLET(S): 5; 325 TABLET ORAL at 09:37

## 2024-04-05 NOTE — ED PROVIDER NOTE - PHYSICAL EXAMINATION
Gen: No acute distress, non toxic  HEENT: Mucous membranes moist, pink conjunctivae, EOMI. abrasion to left scalp.   CV: RRR, nl s1/s2.  Resp: CTAB, normal rate and effort  GI: Abdomen soft, NT, ND. No rebound, no guarding  Neuro: A&O x 3, moving all 4 extremities. CNII-XII intact.   MSK: c spine ttp and cervical paraspinal ttp.   Skin: No rashes. intact and perfused.

## 2024-04-05 NOTE — ED PROVIDER NOTE - ATTENDING APP SHARED VISIT CONTRIBUTION OF CARE
I, Enmanuel Espinosa MD, performed the initial face to face bedside interview with this patient regarding history of present illness, review of symptoms and relevant past medical, social and family history.  I completed an independent physical examination.  I was the initial provider who evaluated this patient. I have signed out the follow up of any pending tests (i.e. labs, radiological studies) to the ACP.  I have communicated the patient’s plan of care and disposition with the ACP.

## 2024-04-05 NOTE — ED PROVIDER NOTE - PATIENT PORTAL LINK FT
You can access the FollowMyHealth Patient Portal offered by [facility] by registering at the following website: [email or web address]. By joining [employer]’s FollowMyHealth portal, you will also be able to view your health information using other applications (apps) compatible with our system.
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
## 2024-04-05 NOTE — ED ADULT NURSE NOTE - NSFALLUNIVINTERV_ED_ALL_ED
Bed/Stretcher in lowest position, wheels locked, appropriate side rails in place/Call bell, personal items and telephone in reach/Instruct patient to call for assistance before getting out of bed/chair/stretcher/Non-slip footwear applied when patient is off stretcher/[place] to call system/Physically safe environment - no spills, clutter or unnecessary equipment/Purposeful proactive rounding/Room/bathroom lighting operational, light cord in reach
negative/1/3/19
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
## 2024-07-16 NOTE — BRIEF OPERATIVE NOTE - NSICDXBRIEFOPLAUNCH_GEN_ALL_CORE
Care Transitions Note    Follow Up Call     Attempted to reach patient for transitions of care follow up.  Unable to reach patient.      Outreach Attempts:   1st attempt, left message    Care Summary Note: Unable to reach. Patient on surgery schedule for toe amputation with wound vac for end of month. Last office notes reviewed.      Follow Up Appointment:       Plan for follow-up on next business day.  based on severity of symptoms and risk factors. Plan for next call:  Check on pain, any s/s of worsening infection?     Kiya Deal RN        
<--- Click to Launch ICDx for PreOp, PostOp and Procedure

## 2024-09-12 NOTE — ASU PATIENT PROFILE, ADULT - BARIATRIC
Necessary Vaccines for Older Adults (65+)    The following vaccines were discussed during today's visit:     You need Influenza Vaccine protects against serious illness from yearly flu viruses. All older adults should get a flu vaccine, especially people who are: 65+, residing in nursing homes, persons with serious health conditions (heart disease, diabetes, asthma, lung disease, or HIV, or those who are caring for older adults. Flu shots are available each fall.    Already got it Pneumococcal (Pneumonia) Vaccine protects against serious illness caused by pneumococcal bacteria, including pneumonia. Recommended for everyone over the age of 65. Pneumococcal vaccine schedules vary by type of vaccine. Ask your healthcare provider which vaccine is best for you.    You need Tetanus / Diphtheria Vaccine (Tdap) protects you from tetanus, diththeria, and another disease named pertussis (also called whooping cough). This vaccine is recommended for everyone. You should get a one-time dose of the Tdap vaccine if you are 65+ and have not had the Tdap vaccine previously to protect you from whooping cough. Tetanus is administered once every 10 years.     You need RSV (Respiriatory Syncytial Virus) Vaccine protects you from serious illness from RSV. RSV causes disease that affects the lungs, similar to influenza (flu). The CDC recommends older adults discuss whether to get a vaccine with their healthcare provider. The RSV vaccine requires one dose and is typically given before the RSV season in the fall and winter.  It can be taken whenever it is available to you as well.     You need Shingles / Herpes Zoster Vaccine (Shingrix) protects you from getting shingles (a complications that causes chronic pain (postherpetic neuralgia). Recommended for persons 50+, even if you have had shingles before or received the older Zostavax shot. Shingrix requires 2 doses, the 2nd dose should be given between 2-6 months after the 1st  dose.    COVID-19 Vaccine protects against getting seriously ill from COVID-19. Recommended for everyone 65+. Recommended to be taken whenever an updated vaccine is made available to the public.     no

## 2025-04-30 NOTE — H&P PST ADULT - LIVING CHILDREN, OB PROFILE
SUBJECTIVE:   Mary Hastings is a 45 year old female    who presents for her annual well woman exam. No concerns, periods have been irregular, no period for 3 months and then they returned.  Patient's last menstrual period was 2025.    OB History    Para Term  AB Living   4 3 2 1 1 2   SAB IAB Ectopic Molar Multiple Live Births   1 0 0 0 0 3     GYN History:   Bleeding pattern:  irregular over the last year, moderate flow  Current Contraception: vasectomy  Pap History: Negative for intraepithelial lesion or malignancy, HPV negative  Date:   Mammogram: up to date and done 2025    Current Outpatient Medications   Medication Sig Dispense Refill   • MAGNESIUM PO      • spironolactone (ALDACTONE) 25 MG tablet 1 po bid 60 tablet 5   • diclofenac (VOLTAREN) 75 MG EC tablet Take 1 tablet by mouth in the morning and 1 tablet in the evening. (Patient not taking: Reported on 2025) 30 tablet 0   • fludrocortisone (FLORINEF) 0.1 MG tablet Take 1 tablet by mouth daily. 90 tablet 3   • Multiple Vitamin (MULTIVITAMIN ADULT PO) Take 1 tablet by mouth daily.     • vitamin B-12 (CYANOCOBALAMIN) 1000 MCG tablet Take 1,000 mcg by mouth daily.     • cholecalciferol (VITAMIN D) 25 mcg (1,000 units) tablet Take 25 mcg by mouth daily.     • CALCIUM PO Take 1 tablet by mouth daily.       No current facility-administered medications for this visit.     ALLERGIES:  No Known Allergies   Patient Active Problem List   Diagnosis   • Hypercoagulable state  (CMD)   • IBS (irritable bowel syndrome)   • Venous insufficiency   • POTS (postural orthostatic tachycardia syndrome)   • Heart palpitations   • Prothrombin gene mutation  (CMD)   • Varicose veins of bilateral lower extremities with pain   • Cervical disc disorder with radiculopathy   • Right arm pain     Past Medical History:   Diagnosis Date   • Allergy    • ASCUS with positive high risk HPV cervical ,    • H/O prothrombin mutation      sees a hematologist at [place]   • HPV (human papilloma virus) infection 2015   • LGSIL on Pap smear of cervix 2015    HPV+   • POTS (postural orthostatic tachycardia syndrome)    • Venous insufficiency     CEAP 2; 2020 s/p Left SSV RFA      Past Surgical History:   Procedure Laterality Date   •  section, classic     •  section, low transverse     • Colposcopy  ,    • Endovenous laser, 1st vein Left 2015 Endovenous Laser ablation (EVLA) Left Great Saphenous Vein Dr. Steel   • Hb sclerotherapy multiple veins  2020    Sclerotherapy of the left superficial varices   • Hb sclerotherapy multiple veins Left 2020    Sclerotherapy of the left superficial varices   • Injection therapy vein,mult veins Left 2020    Sclerotherapy of the left leg superficial varices   • Rfa endovenous extremity first vein Left 2020 s/p Left SSV RFA at [facility] Dr. Olsen   • Tonsillectomy     • Us guidance sclerotherapy multiple veins Left 10/09/2023    Ultrasound guided sclerotherapy of the left superficial varices   • Us vasc guided sclerotherapy Left 2016 Ultrasound-guided foam sclerotherapy (UGFS) Left leg     Family History   Problem Relation Age of Onset   • Other (other \) Mother         RA   • Other Father         Prothrombin gene mutation   • Other Sister         Prothrombin gene mutation   • Cancer, Ovarian Maternal Grandmother    • Cancer, Throat Maternal Grandfather      Social History     Socioeconomic History   • Marital status: /Civil Union     Spouse name: Not on file   • Number of children: Not on file   • Years of education: Not on file   • Highest education level: Not on file   Occupational History   • Occupation:    Tobacco Use   • Smoking status: Former     Current packs/day: 0.00     Types: Cigarettes     Start date:      Quit date: 2013     Years since quittin.3   • Smokeless tobacco: Never   Vaping Use   •  Vaping status: never used   Substance and Sexual Activity   • Alcohol use: Not Currently     Comment: 3x mo   • Drug use: Never   • Sexual activity: Yes     Partners: Male     Birth control/protection: Surgical     Comment: Vasectomy   Other Topics Concern   • Not on file   Social History Narrative   • Not on file     Social Drivers of Health     Financial Resource Strain: Not on file   Food Insecurity: Not on file   Transportation Needs: Not on file   Physical Activity: Not on file   Stress: Not on file   Social Connections: Unknown (3/18/2021)    [facility]    Social Connections    • Conversations with friends/family/neighbors per week: Not on file   Feeling safe in your relationship: Not on file       Depression Screening:  Depression Screen  More Data Synopsis  PHQ2/9 Numeric Score  More data exists       4/30/2025 7/16/2024   PHQ 2/9 Numeric Score   Adult PHQ 2 Score 0 0   Adult PHQ 2 Interpretation No further screening needed No further screening needed   DEPRESSION ASSESSMENT/PLAN:  Depression screening is negative no further plan needed.         PREVENTATIVE MEDICINE:  Does patient exercise? yes  weights, yoga, pilates  5 times per week  Was counseling given: Yes     GYNE ROS:   Vaginal symptoms: none.  Vulvar symptoms: none.  Discharge described as: normal and physiologic.    ROS:   No headaches  No unexplained chest pain, dyspnea, SOB  No abdominal pain  No bowel or bladder complaints  All other systems reviewed are negative    OBJECTIVE:  Visit Vitals  /62   Pulse 78   Temp 98.2 °F (36.8 °C) (Temporal)   Wt 56.7 kg (125 lb)   LMP 03/23/2025   SpO2 100%   BMI 20.80 kg/m²     Mary's BMI is Body mass index is 20.8 kg/m²., which is , which is within normal parameters.(Ages 18-64 >/= 18.5 and <25; Over age 65 >/= 23 and <30)      Alert and oriented x 3.   General exam: Patient appears well.  Neck supple, no adenopathy or masses noted  in neck or supraclavicular regions.  Thyroid is not enlarged.   Chest is clear.   Heart sounds are normal without murmur. RRR.   Abdomen is normal, soft without masses, organomegaly or tenderness.   Skin: no rashes or suspicious skin lesions noted.    Breast Exam: breasts symmetric, no dominant or suspicious mass, no skin or nipple changes, no axillary adenopathy, and self exam is taught and encouraged.    OBGyn Exam    Pelvic Exam: Genitalia  -- External Genitalia: Normal appearance and hair distribution.  No lesions noted  -- Urethral Meatus: Normal size and location, no lesions or prolapse.    Pelvic Exam with speculum  -- Urethra: No masses, tenderness or scarring  -- Bartholin normal  -- Vagina: Normal general appearance, no discharge or lesions.  Adequate pelvic support.  No cystocele or rectocele found  -- Cervix: Normal appearance without lesions or discharge  -- Uterus: Normal size, contour, position, mobility, and support.  No tenderness  -- Adnexa:  No masses, tenderness, organomegaly or nodularity noted    Pap smear collected: was collected with broom  prepared,sent to lab for processing.     ASSESSMENT/PLAN:  1. Encounter for gynecological examination (general) (routine) without abnormal findings (Primary)  Pap guidelines discussed, pap and HPV done at pt's request  Mammo up to date  Will continue to monitor periods, call if become overly irregular.     Preventative topics discussed:Pap results in 7-10 days, patient will be notified, patient instructed to call in 2 wks if no notification., Recommended mammograms yearly, and Return for annual GYN exam in one year or earlier with any additional concerns.     Donna Griffin, CNP       6